# Patient Record
Sex: FEMALE | Race: WHITE | URBAN - METROPOLITAN AREA
[De-identification: names, ages, dates, MRNs, and addresses within clinical notes are randomized per-mention and may not be internally consistent; named-entity substitution may affect disease eponyms.]

---

## 2017-02-24 DIAGNOSIS — E53.8 VITAMIN B12 DEFICIENCY (NON ANEMIC): ICD-10-CM

## 2017-02-24 DIAGNOSIS — D50.0 IRON DEFICIENCY ANEMIA DUE TO CHRONIC BLOOD LOSS: ICD-10-CM

## 2017-02-24 LAB
BASOPHILS # BLD AUTO: 0.1 10E9/L (ref 0–0.2)
BASOPHILS NFR BLD AUTO: 0.7 %
DIFFERENTIAL METHOD BLD: ABNORMAL
EOSINOPHIL # BLD AUTO: 0.2 10E9/L (ref 0–0.7)
EOSINOPHIL NFR BLD AUTO: 3.5 %
ERYTHROCYTE [DISTWIDTH] IN BLOOD BY AUTOMATED COUNT: 13.5 % (ref 10–15)
FERRITIN SERPL-MCNC: 57 NG/ML (ref 12–150)
HCT VFR BLD AUTO: 46.1 % (ref 35–47)
HGB BLD-MCNC: 16 G/DL (ref 11.7–15.7)
IRON SATN MFR SERPL: 27 % (ref 15–46)
IRON SERPL-MCNC: 77 UG/DL (ref 35–180)
LYMPHOCYTES # BLD AUTO: 2.7 10E9/L (ref 0.8–5.3)
LYMPHOCYTES NFR BLD AUTO: 39.6 %
MCH RBC QN AUTO: 32.5 PG (ref 26.5–33)
MCHC RBC AUTO-ENTMCNC: 34.7 G/DL (ref 31.5–36.5)
MCV RBC AUTO: 94 FL (ref 78–100)
MONOCYTES # BLD AUTO: 0.7 10E9/L (ref 0–1.3)
MONOCYTES NFR BLD AUTO: 10.7 %
NEUTROPHILS # BLD AUTO: 3.1 10E9/L (ref 1.6–8.3)
NEUTROPHILS NFR BLD AUTO: 45.5 %
PLATELET # BLD AUTO: 247 10E9/L (ref 150–450)
RBC # BLD AUTO: 4.92 10E12/L (ref 3.8–5.2)
TIBC SERPL-MCNC: 281 UG/DL (ref 240–430)
VIT B12 SERPL-MCNC: 227 PG/ML (ref 193–986)
WBC # BLD AUTO: 6.8 10E9/L (ref 4–11)

## 2017-02-24 PROCEDURE — 83540 ASSAY OF IRON: CPT | Performed by: INTERNAL MEDICINE

## 2017-02-24 PROCEDURE — 85025 COMPLETE CBC W/AUTO DIFF WBC: CPT | Performed by: INTERNAL MEDICINE

## 2017-02-24 PROCEDURE — 83550 IRON BINDING TEST: CPT | Performed by: INTERNAL MEDICINE

## 2017-02-24 PROCEDURE — 82607 VITAMIN B-12: CPT | Performed by: INTERNAL MEDICINE

## 2017-02-24 PROCEDURE — 82728 ASSAY OF FERRITIN: CPT | Performed by: INTERNAL MEDICINE

## 2017-02-24 PROCEDURE — 36415 COLL VENOUS BLD VENIPUNCTURE: CPT | Performed by: INTERNAL MEDICINE

## 2017-02-27 ENCOUNTER — ONCOLOGY VISIT (OUTPATIENT)
Dept: ONCOLOGY | Facility: CLINIC | Age: 42
End: 2017-02-27
Payer: COMMERCIAL

## 2017-02-27 VITALS
SYSTOLIC BLOOD PRESSURE: 97 MMHG | BODY MASS INDEX: 25.69 KG/M2 | TEMPERATURE: 98.2 F | WEIGHT: 159.1 LBS | HEART RATE: 90 BPM | OXYGEN SATURATION: 97 % | RESPIRATION RATE: 18 BRPM | DIASTOLIC BLOOD PRESSURE: 65 MMHG

## 2017-02-27 DIAGNOSIS — E53.8 VITAMIN B12 DEFICIENCY (NON ANEMIC): Primary | ICD-10-CM

## 2017-02-27 DIAGNOSIS — F31.81 BIPOLAR II DISORDER (H): ICD-10-CM

## 2017-02-27 DIAGNOSIS — E61.1 IRON DEFICIENCY: ICD-10-CM

## 2017-02-27 DIAGNOSIS — G44.219 EPISODIC TENSION-TYPE HEADACHE, NOT INTRACTABLE: ICD-10-CM

## 2017-02-27 PROCEDURE — 99213 OFFICE O/P EST LOW 20 MIN: CPT | Mod: 25 | Performed by: INTERNAL MEDICINE

## 2017-02-27 PROCEDURE — 96372 THER/PROPH/DIAG INJ SC/IM: CPT

## 2017-02-27 RX ORDER — CYANOCOBALAMIN 1000 UG/ML
1 INJECTION, SOLUTION INTRAMUSCULAR; SUBCUTANEOUS
Qty: 3 ML | Refills: 3 | OUTPATIENT
Start: 2017-02-27 | End: 2018-02-27

## 2017-02-27 RX ORDER — CYANOCOBALAMIN 1000 UG/ML
1000 INJECTION, SOLUTION INTRAMUSCULAR; SUBCUTANEOUS
Status: DISCONTINUED | OUTPATIENT
Start: 2017-02-27 | End: 2017-02-27 | Stop reason: HOSPADM

## 2017-02-27 RX ORDER — CYANOCOBALAMIN 1000 UG/ML
1000 INJECTION, SOLUTION INTRAMUSCULAR; SUBCUTANEOUS
Status: CANCELLED
Start: 2017-03-01

## 2017-02-27 RX ADMIN — CYANOCOBALAMIN 1000 MCG: 1000 INJECTION, SOLUTION INTRAMUSCULAR; SUBCUTANEOUS at 15:48

## 2017-02-27 ASSESSMENT — PAIN SCALES - GENERAL: PAINLEVEL: NO PAIN (0)

## 2017-02-27 NOTE — PROGRESS NOTES
"Rhys Hernandez is a 41 year old female who presents for:  Chief Complaint   Patient presents with     Oncology Clinic Visit     6 month follow-up        Initial Vitals:  BP 97/65 (BP Location: Left arm, Patient Position: Chair, Cuff Size: Adult Regular)  Pulse 90  Temp 98.2  F (36.8  C) (Oral)  Resp 18  Wt 72.2 kg (159 lb 1.6 oz)  SpO2 97%  BMI 25.69 kg/m2 Estimated body mass index is 25.69 kg/(m^2) as calculated from the following:    Height as of 8/22/16: 1.676 m (5' 5.98\").    Weight as of this encounter: 72.2 kg (159 lb 1.6 oz).. Body surface area is 1.83 meters squared. BP completed using cuff size: regular  No Pain (0) No LMP recorded. Allergies and medications reviewed.     Medications: Medication refills not needed today.  Pharmacy name entered into [employer]: [id number] [place] [address]    Comments: Patient has noted more headaches lately. Has been having anxiety/panic attacks. Was on medications in the past which helped at that time.    10 minutes for nursing intake (face to face time)   Ml Ellis RN        "

## 2017-02-27 NOTE — PROGRESS NOTES
[facility] CANCER CLINIC  FOLLOW-UP VISIT NOTE    PATIENT NAME: Rhys Hernandez MRN # [id number]  DATE OF VISIT: Feb 27, 2017 YOB: 1975    REFERRING PROVIDER: No referring provider defined for this encounter.    DIAGNOSIS: Mixed Iron deficiency anemia and vitamin B12 deficiency post gastric bypass      SUBJECTIVE   Rhys Hernandez is 41 year old female with ronda-metrorrhagia and previous gastric bypass has been referred for iron deficiency anemia.     Rhys has noticed headaches over the past few weeks. She is not a headache person. Tylenol and ibuprofen does not seem to help. Headaches are quite intense.       PAST MEDICAL HISTORY   1. [place]- metrorrhagia  2. Iron deficiency anemia  3. Anxiety, Depression  4. Gastric bypass  5. Tubal ligation  6. Active nicotine abuse, previous alcohol      CURRENT OUTPATIENT MEDICATIONS     Current Outpatient Prescriptions   Medication Sig     cyanocobalamin (VITAMIN B12) 1000 MCG/ML injection Inject 1 mL (1,000 mcg) into the muscle every 30 days     Cholecalciferol (VITAMIN D) 2000 UNITS tablet Take 2,000 Units by mouth daily     Omega-3 Fatty Acids (OMEGA-3 FISH OIL PO)      MELATONIN PO      polyethylene glycol (MIRALAX) powder Take 17 g by mouth daily     thiamine (VITAMIN B-1) 100 MG tablet Take 100 mg by mouth daily.     Prenatal MV-Min-Fe Fum-FA-DHA (PRENATAL 1 PO) Take  by mouth.     VITAMIN B-12 1000 MCG SL SUBL Reported on 2/27/2017     No current facility-administered medications for this visit.      Facility-Administered Medications Ordered in Other Visits   Medication     cyanocobalamin (VITAMIN B12) injection 1,000 mcg        ALLERGIES   No Known Allergies     REVIEW OF SYSTEMS   As above in the HPI, o/w complete 12-point ROS was negative.     PHYSICAL EXAM   BP 97/65 (BP Location: Left arm, Patient Position: Chair, Cuff Size: Adult Regular)  Pulse 90  Temp 98.2  F (36.8  C) (Oral)  Resp 18  Wt 72.2 kg (159 lb 1.6 oz)  SpO2 97%   BMI 25.69 kg/m2   SpO2 Readings from Last 4 Encounters:   02/27/17 97%   10/22/16 96%   09/27/16 96%   08/30/16 96%     Wt Readings from Last 3 Encounters:   02/27/17 72.2 kg (159 lb 1.6 oz)   10/22/16 70.8 kg (156 lb)   08/22/16 68.9 kg (152 lb)     GEN: NAD  HEENT: PERRL, EOMI, no icterus, injection or pallor. Oropharynx is clear.  NECK: no cervical or supraclavicular lymphadenopathy  LUNGS: clear bilaterally  CV: regular, no murmurs, rubs, or gallops  ABDOMEN: soft, non-tender, non-distended, normal bowel sounds, no hepatosplenomegaly by percussion or palpation  EXT: warm, well perfused, no edema  NEURO: alert  SKIN: no rashes     LABORATORY AND IMAGING STUDIES     Recent Labs   Lab Test  08/25/15   0741  05/25/11   2029  03/10/10   0802   NA  141  137  140   POTASSIUM  4.3  3.9  4.4   CHLORIDE  108  101  106   CO2  24  27  27   ANIONGAP  9  9  7   BUN  9  12  12   CR  0.79  0.65  0.66   GLC  90  78  82   JOSE  8.8  8.7  8.9     Recent Labs   Lab Test  02/24/17   0727  08/18/16   0821  05/02/16   0716  01/15/16   0941  11/19/15   1342  08/25/15   0741   WBC  6.8  5.4  7.0  6.7  6.1  7.1   HGB  16.0*  15.7  13.6  9.2*  10.5*  10.1*   PLT  247  225  228  328  323  353   MCV  94  92  84  68*  68*  69*   NEUTROPHIL  45.5  48.9  42.3   --    --   41.1     Recent Labs   Lab Test  08/25/15   0741  09/20/12   0908  08/19/11   0925   BILITOTAL  0.3  0.4  0.4   ALKPHOS  57  66  48   ALT  19  25  27   AST  16  45  38   ALBUMIN  3.8  4.4  4.3     TSH   Date Value Ref Range Status   08/25/2015 3.32 0.40 - 4.00 mU/L Final   09/20/2012 1.62 0.4 - 5.0 mU/L Final     Recent Labs   Lab Test  02/24/17   0727  08/18/16   0821  05/02/16   0716   B12  227  238  184*     Recent Labs   Lab Test  02/24/17   0727  08/18/16   0821  05/02/16   0716  01/15/16   0941  11/19/15   1342  08/28/14   1204   BRENT  57  74  6*  7*  4*  4*   IRON  77  132  30*   --    --   23*   FEB  281  282  318   --    --   474*         ASSESSMENT AND PLAN   1. Iron  deficiency anemia with chronic blood loss from ronda-metrorrhagia and poor absorption secondary to gastric bypass  2. Vitamin B12 deficiency; after gastric bypass due to poor absorption despite oral supplmentation  3. Headaches  4. Active nicotine abuse  5. Anxiety, depression     DISCUSSION     Rhys has iron and B12 deficiency anemia as she cannot absorb iron after gastric bypass and menometrorrhagia.      Her iron panel remains adequate at this visit. Her Hgb is above the higher limit of normal, though not very different from last time. She would need IV iron intermittently for the rest of her life because of her gastric bypass.      In addition to her iron, she is also deficient in vitamin B12.  Her B12 remains low and I will start her on parenteral B12 injections every month. She will take it herself at home.     I would recommend 1 mg of vitamin B12 IM q 28 days. I will follow in 3 months or so with labs to assess response to B12 therapy.     I again reviewed the ill effects of smoking and will approach the topic again at next visit.  She promises me that she has been trying to quit smoking.  She has bought nicotine patches and plans to use them.  She has set goals for herself.  She plans to visit [place] from the money saved after quitting smoking.        PLAN      1. Recommend parenteral vitamin B12  2. I will check B12 levels at follow up visit  3. Follow up in 3 months with labs a week prior to clinic visit  4. I will stress on quitting smoking again at next visit. She notes that she does plan to quit.      Quincy Hussein  ,  Division of Hematology, Oncology & Transplantation  [facility].

## 2017-02-27 NOTE — PROGRESS NOTES
Infusion Nursing Note:  Rhys Hernandez presents today for B12 injection.    Patient seen by provider today: Yes: Dr Hussein   present during visit today: Not Applicable.    Treatment Conditions:  Labs reviewed by Dr Hussein.      Post Infusion Assessment:  Patient tolerated injection without incident.    Discharge Plan:   Patient discharged in stable condition accompanied by: self.  Departure Mode: Ambulatory.  Dr Hussein to complete disposition note. Patient will call to schedule follow-up. Going to do home B12 injections.    Ml Ellis RN

## 2017-02-27 NOTE — MR AVS SNAPSHOT
After Visit Summary   2/27/2017    Rhys Hernandez    MRN: [id number]           Patient Information     Date Of Birth          1975        Visit Information        Provider Department      2/27/2017 3:00 PM Quincy Hussein MD [facility]        Today's Diagnoses     Vitamin B12 deficiency (non anemic)    -  1    Episodic tension-type headache, not intractable        Iron deficiency        Bipolar II disorder (H)           Follow-ups after your visit        Follow-up notes from your care team     Return in about 3 months (around 5/27/2017).      Who to contact     If you have questions or need follow up information about today's clinic visit or your schedule please contact [facility] directly at [phone].  Normal or non-critical lab and imaging results will be communicated to you by MyChart, letter or phone within 4 business days after the clinic has received the results. If you do not hear from us within 7 days, please contact the clinic through MyChart or phone. If you have a critical or abnormal lab result, we will notify you by phone as soon as possible.  Submit refill requests through [employer] or call your pharmacy and they will forward the refill request to us. Please allow 3 business days for your refill to be completed.          Additional Information About Your Visit        MyChart Information     [employer] is an electronic gateway that provides easy, online access to your medical records. With [employer], you can request a clinic appointment, read your test results, renew a prescription or communicate with your care team.     To sign up for [employer] visit the website at [email or web address]   You will be asked to enter the access code listed below, as well as some personal information. Please follow the directions to create your username and password.     Your access code is: [email or web address]  Expires: 5/28/2017  3:55 PM     Your access code will   in 90 days. If you need help or a new code, please contact your [facility] or call [phone] for assistance.        Care EveryWhere ID     This is your Care EveryWhere ID. This could be used by other organizations to access your [place] medical records  [id number]        Your Vitals Were     Pulse Temperature Respirations Pulse Oximetry BMI (Body Mass Index)       90 98.2  F (36.8  C) (Oral) 18 97% 25.69 kg/m2        Blood Pressure from Last 3 Encounters:   17 97/65   10/22/16 101/65   16 (!) 87/59    Weight from Last 3 Encounters:   17 72.2 kg (159 lb 1.6 oz)   10/22/16 70.8 kg (156 lb)   16 68.9 kg (152 lb)              Today, you had the following     No orders found for display         Today's Medication Changes          These changes are accurate as of: 17  4:04 PM.  If you have any questions, ask your nurse or doctor.               These medicines have changed or have updated prescriptions.        Dose/Directions    * cyanocobalamin 1000 MCG Subl sublingual tablet   This may have changed:  Another medication with the same name was added. Make sure you understand how and when to take each.   Changed by:  Sindy Sanchez PA-C        Reported on 2017   Refills:  0       * cyanocobalamin 1000 MCG/ML injection   Commonly known as:  VITAMIN B12   This may have changed:  You were already taking a medication with the same name, and this prescription was added. Make sure you understand how and when to take each.   Used for:  Vitamin B12 deficiency (non anemic), Episodic tension-type headache, not intractable, Iron deficiency, Bipolar II disorder (H)   Changed by:  Quincy Hussein MD        Dose:  1 mL   Inject 1 mL (1,000 mcg) into the muscle every 30 days   Quantity:  3 mL   Refills:  3       * Notice:  This list has 2 medication(s) that are the same as other medications prescribed for you. Read the directions carefully, and ask  your doctor or other care provider to review them with you.         Where to get your medicines      Some of these will need a paper prescription and others can be bought over the counter.  Ask your nurse if you have questions.     You don't need a prescription for these medications     cyanocobalamin 1000 MCG/ML injection                Primary Care Provider Office Phone # Fax #    Angel Klein [phone]       [facility] [address]  [facility] [address]        Thank you!     Thank you for choosing [facility]  for your care. Our goal is always to provide you with excellent care. Hearing back from our patients is one way we can continue to improve our services. Please take a few minutes to complete the written survey that you may receive in the mail after your visit with us. Thank you!             Your Updated Medication List - Protect others around you: Learn how to safely use, store and throw away your medicines at www.disposemymeds.org.          This list is accurate as of: 2/27/17  4:04 PM.  Always use your most recent med list.                   Brand Name Dispense Instructions for use    * cyanocobalamin 1000 MCG Subl sublingual tablet      Reported on 2/27/2017       * cyanocobalamin 1000 MCG/ML injection    VITAMIN B12    3 mL    Inject 1 mL (1,000 mcg) into the muscle every 30 days       MELATONIN PO          OMEGA-3 FISH OIL PO          polyethylene glycol powder    MIRALAX    510 g    Take 17 g by mouth daily       PRENATAL 1 PO      Take  by mouth.       thiamine 100 MG tablet      Take 100 mg by mouth daily.       vitamin D 2000 UNITS tablet     100 tablet    Take 2,000 Units by mouth daily       * Notice:  This list has 2 medication(s) that are the same as other medications prescribed for you. Read the directions carefully, and ask your doctor or other care provider to review them with you.

## 2017-02-27 NOTE — MR AVS SNAPSHOT
After Visit Summary   2017    Rhys Hernandez    MRN: [id number]           Patient Information     Date Of Birth          1975        Visit Information        Provider Department      2017 3:30 PM NURSE ONLY CANCER CENTER [facility]        Today's Diagnoses     Vitamin B12 deficiency (non anemic)    -  1       Follow-ups after your visit        Who to contact     If you have questions or need follow up information about today's clinic visit or your schedule please contact [facility] directly at [phone].  Normal or non-critical lab and imaging results will be communicated to you by [employer], letter or phone within 4 business days after the clinic has received the results. If you do not hear from us within 7 days, please contact the clinic through [employer] or phone. If you have a critical or abnormal lab result, we will notify you by phone as soon as possible.  Submit refill requests through [email or web address] or call your pharmacy and they will forward the refill request to us. Please allow 3 business days for your refill to be completed.          Additional Information About Your Visit        MyChart Information     [email or web address] is an electronic gateway that provides easy, online access to your medical records. With [email or web address], you can request a clinic appointment, read your test results, renew a prescription or communicate with your care team.     To sign up for [email or web address] visit the website at [email or web address]   You will be asked to enter the access code listed below, as well as some personal information. Please follow the directions to create your username and password.     Your access code is: [email or web address]  Expires: 2017  3:55 PM     Your access code will  in 90 days. If you need help or a new code, please contact your [facility] or call [phone] for assistance.        Care EveryWhere ID     This is your Care  EveryWhere ID. This could be used by other organizations to access your [place] medical records  [id number]         Blood Pressure from Last 3 Encounters:   02/27/17 97/65   10/22/16 101/65   09/27/16 (!) 87/59    Weight from Last 3 Encounters:   02/27/17 72.2 kg (159 lb 1.6 oz)   10/22/16 70.8 kg (156 lb)   08/22/16 68.9 kg (152 lb)              Today, you had the following     No orders found for display         Today's Medication Changes          These changes are accurate as of: 2/27/17  3:55 PM.  If you have any questions, ask your nurse or doctor.               These medicines have changed or have updated prescriptions.        Dose/Directions    * cyanocobalamin 1000 MCG Subl sublingual tablet   This may have changed:  Another medication with the same name was added. Make sure you understand how and when to take each.   Changed by:  Sindy Sanchez PA-C        Reported on 2/27/2017   Refills:  0       * cyanocobalamin 1000 MCG/ML injection   Commonly known as:  VITAMIN B12   This may have changed:  You were already taking a medication with the same name, and this prescription was added. Make sure you understand how and when to take each.   Used for:  Vitamin B12 deficiency (non anemic), Episodic tension-type headache, not intractable, Iron deficiency, Bipolar II disorder (H)   Changed by:  Quincy Hussein MD        Dose:  1 mL   Inject 1 mL (1,000 mcg) into the muscle every 30 days   Quantity:  3 mL   Refills:  3       * Notice:  This list has 2 medication(s) that are the same as other medications prescribed for you. Read the directions carefully, and ask your doctor or other care provider to review them with you.         Where to get your medicines      Some of these will need a paper prescription and others can be bought over the counter.  Ask your nurse if you have questions.     You don't need a prescription for these medications     cyanocobalamin 1000 MCG/ML injection                Primary  Care Provider Office Phone # Fax #    Angel Klein [phone]       [facility] [address]  [facility] [address]        Thank you!     Thank you for choosing [facility]  for your care. Our goal is always to provide you with excellent care. Hearing back from our patients is one way we can continue to improve our services. Please take a few minutes to complete the written survey that you may receive in the mail after your visit with us. Thank you!             Your Updated Medication List - Protect others around you: Learn how to safely use, store and throw away your medicines at www.disposemymeds.org.          This list is accurate as of: 2/27/17  3:55 PM.  Always use your most recent med list.                   Brand Name Dispense Instructions for use    * cyanocobalamin 1000 MCG Subl sublingual tablet      Reported on 2/27/2017       * cyanocobalamin 1000 MCG/ML injection    VITAMIN B12    3 mL    Inject 1 mL (1,000 mcg) into the muscle every 30 days       MELATONIN PO          OMEGA-3 FISH OIL PO          polyethylene glycol powder    MIRALAX    510 g    Take 17 g by mouth daily       PRENATAL 1 PO      Take  by mouth.       thiamine 100 MG tablet      Take 100 mg by mouth daily.       vitamin D 2000 UNITS tablet     100 tablet    Take 2,000 Units by mouth daily       * Notice:  This list has 2 medication(s) that are the same as other medications prescribed for you. Read the directions carefully, and ask your doctor or other care provider to review them with you.

## 2018-02-02 ENCOUNTER — CARE COORDINATION (OUTPATIENT)
Dept: ONCOLOGY | Facility: CLINIC | Age: 43
End: 2018-02-02

## 2018-02-02 NOTE — PROGRESS NOTES
Returned this patient's call requesting follow-up visit with Dr. Hussein for new symptoms related to her iron deficiency anemia and low B12.  She states she feels like she can barely get through the workday, she is exhausted by the end of the day.  Advised that Dr. Hussein would not be back to this clinic for quite some time.  She is willing to see another provider.  Will call her back with plan.

## 2018-02-05 DIAGNOSIS — D50.0 IRON DEFICIENCY ANEMIA DUE TO CHRONIC BLOOD LOSS: Primary | ICD-10-CM

## 2018-02-05 NOTE — PROGRESS NOTES
See note below.  Dr. Hoover would be willing to see patient if ok with Dr. Hussein.  Dr. Hussein is in agreement.  Will have patient get lab work 1-2 days prior to seeing Dr. Hoover.  Scheduling will be calling her with date and time.

## 2018-02-07 DIAGNOSIS — D50.0 IRON DEFICIENCY ANEMIA DUE TO CHRONIC BLOOD LOSS: ICD-10-CM

## 2018-02-07 LAB
BASOPHILS # BLD AUTO: 0.1 10E9/L (ref 0–0.2)
BASOPHILS NFR BLD AUTO: 1.1 %
DIFFERENTIAL METHOD BLD: ABNORMAL
EOSINOPHIL # BLD AUTO: 0.2 10E9/L (ref 0–0.7)
EOSINOPHIL NFR BLD AUTO: 3.3 %
ERYTHROCYTE [DISTWIDTH] IN BLOOD BY AUTOMATED COUNT: 13 % (ref 10–15)
FERRITIN SERPL-MCNC: 49 NG/ML (ref 12–150)
HCT VFR BLD AUTO: 48 % (ref 35–47)
HGB BLD-MCNC: 15.8 G/DL (ref 11.7–15.7)
IMM GRANULOCYTES # BLD: 0 10E9/L (ref 0–0.4)
IMM GRANULOCYTES NFR BLD: 0.3 %
IRON SATN MFR SERPL: 56 % (ref 15–46)
IRON SERPL-MCNC: 163 UG/DL (ref 35–180)
LYMPHOCYTES # BLD AUTO: 2.4 10E9/L (ref 0.8–5.3)
LYMPHOCYTES NFR BLD AUTO: 32.7 %
MCH RBC QN AUTO: 30.7 PG (ref 26.5–33)
MCHC RBC AUTO-ENTMCNC: 32.9 G/DL (ref 31.5–36.5)
MCV RBC AUTO: 93 FL (ref 78–100)
MONOCYTES # BLD AUTO: 0.7 10E9/L (ref 0–1.3)
MONOCYTES NFR BLD AUTO: 10.3 %
NEUTROPHILS # BLD AUTO: 3.8 10E9/L (ref 1.6–8.3)
NEUTROPHILS NFR BLD AUTO: 52.3 %
PLATELET # BLD AUTO: 253 10E9/L (ref 150–450)
RBC # BLD AUTO: 5.14 10E12/L (ref 3.8–5.2)
TIBC SERPL-MCNC: 289 UG/DL (ref 240–430)
VIT B12 SERPL-MCNC: 195 PG/ML (ref 193–986)
WBC # BLD AUTO: 7.2 10E9/L (ref 4–11)

## 2018-02-07 PROCEDURE — 85025 COMPLETE CBC W/AUTO DIFF WBC: CPT | Performed by: INTERNAL MEDICINE

## 2018-02-07 PROCEDURE — 83550 IRON BINDING TEST: CPT | Performed by: INTERNAL MEDICINE

## 2018-02-07 PROCEDURE — 82728 ASSAY OF FERRITIN: CPT | Performed by: INTERNAL MEDICINE

## 2018-02-07 PROCEDURE — 82607 VITAMIN B-12: CPT | Performed by: INTERNAL MEDICINE

## 2018-02-07 PROCEDURE — 83540 ASSAY OF IRON: CPT | Performed by: INTERNAL MEDICINE

## 2018-02-07 PROCEDURE — 36415 COLL VENOUS BLD VENIPUNCTURE: CPT | Performed by: INTERNAL MEDICINE

## 2018-02-09 ENCOUNTER — ONCOLOGY VISIT (OUTPATIENT)
Dept: ONCOLOGY | Facility: CLINIC | Age: 43
End: 2018-02-09
Payer: COMMERCIAL

## 2018-02-09 VITALS
RESPIRATION RATE: 16 BRPM | TEMPERATURE: 98.3 F | OXYGEN SATURATION: 100 % | SYSTOLIC BLOOD PRESSURE: 100 MMHG | DIASTOLIC BLOOD PRESSURE: 68 MMHG | WEIGHT: 162.5 LBS | BODY MASS INDEX: 26.24 KG/M2 | HEART RATE: 82 BPM

## 2018-02-09 DIAGNOSIS — R53.83 OTHER FATIGUE: ICD-10-CM

## 2018-02-09 DIAGNOSIS — F32.A DEPRESSION, UNSPECIFIED DEPRESSION TYPE: Primary | ICD-10-CM

## 2018-02-09 LAB — TSH SERPL DL<=0.005 MIU/L-ACNC: 2.6 MU/L (ref 0.4–4)

## 2018-02-09 PROCEDURE — 99214 OFFICE O/P EST MOD 30 MIN: CPT | Performed by: INTERNAL MEDICINE

## 2018-02-09 PROCEDURE — 84443 ASSAY THYROID STIM HORMONE: CPT | Performed by: INTERNAL MEDICINE

## 2018-02-09 PROCEDURE — 36415 COLL VENOUS BLD VENIPUNCTURE: CPT | Performed by: INTERNAL MEDICINE

## 2018-02-09 ASSESSMENT — PAIN SCALES - GENERAL: PAINLEVEL: NO PAIN (0)

## 2018-02-09 NOTE — PROGRESS NOTES
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 I am seeing her in his absence.  She has iron deficiency and B12 deficiency anemia 2/2 ronda-metrorrhagia and previous gastric bypass surgery . She has received Venofer in the past. She was also started on monthly B12 injections by Dr Hussein. She was last seen by him in Feb 2017    Interval history   She comes in today and tells me that over the last few months she has noticed worsening fatigue and tiredness. Off-and-on she has had headaches for which he takes Tylenol and ibuprofen. There is no focal neurological problems. No neuropathy. No pain. No pica symptoms. She has not noticed any unusual bleeding. Now she has Mirena IUD and with that the menstrual bleeding is very scant. Denies any infections fevers or night sweats. No B symptoms. Off-and-on she has had nausea and vomiting since her gastric bypass surgery but this is not new. Off-and-on she has had constipation and this is also not new for her. Denies new swellings. Denies new skin problems. No trouble breathing. She admits that she feels very depressed and stressed out. She attributes it to her home situation as she is not on talking terms with her  for a long time although they are living together. She has arguments with her son and the oldest child moved out a few months ago and the youngest son is also not happy with the living situation. She is not taking any site medications currently. She denies any suicidal or homicidal ideation. She is willing to get psychiatric evaluation. She has not drank alcohol since 2014. She continues to smoke 1 pack a day. She is using vitamin B12 sublingually 1000  g daily          PAST MEDICAL HISTORY- Reviewed in EPIC as below   1. [place]- metrorrhagia  2. Iron deficiency anemia  3. Anxiety, Depression  4. Gastric bypass  5. Tubal  ligation  6. Active nicotine abuse, previous alcohol      CURRENT OUTPATIENT MEDICATIONS     Current Outpatient Prescriptions   Medication Sig     cyanocobalamin (VITAMIN B12) 1000 MCG/ML injection Inject 1 mL (1,000 mcg) into the muscle every 30 days     Cholecalciferol (VITAMIN D) 2000 UNITS tablet Take 2,000 Units by mouth daily     Omega-3 Fatty Acids (OMEGA-3 FISH OIL PO)      MELATONIN PO      polyethylene glycol (MIRALAX) powder Take 17 g by mouth daily     thiamine (VITAMIN B-1) 100 MG tablet Take 100 mg by mouth daily.     Prenatal MV-Min-Fe Fum-FA-DHA (PRENATAL 1 PO) Take  by mouth.     VITAMIN B-12 1000 MCG SL SUBL Reported on 2/27/2017     No current facility-administered medications for this visit.         ALLERGIES   No Known Allergies     SOCIAL HISTORY   This is mentioned in history of present illness     REVIEW OF SYSTEMS     A comprehensive ROS was otherwise neg       PHYSICAL EXAM   /68  Pulse 82  Temp 98.3  F (36.8  C) (Oral)  Resp 16  Wt 73.7 kg (162 lb 8 oz)  SpO2 100%  BMI 26.24 kg/m2   SpO2 Readings from Last 4 Encounters:   02/09/18 100%   02/27/17 97%   10/22/16 96%   09/27/16 96%     Wt Readings from Last 3 Encounters:   02/09/18 73.7 kg (162 lb 8 oz)   02/27/17 72.2 kg (159 lb 1.6 oz)   10/22/16 70.8 kg (156 lb)     CONSTITUTIONAL: no acute distress  EYES: PERRLA, no palor or icterus.   ENT/MOUTH: no mouth lesions. Oropharynx normal  CVS: s1s2 no m r g .   RESPIRATORY: clear to auscultation b/l  GI: soft non tender no hepatosplenomegaly  NEURO: AAOX3  Grossly non focal neuro exam  INTEGUMENT: no obvious rashes  LYMPHATIC: no palpable cervical, supraclavicular, axillary or inguinal LAD  MUSCULOSKELETAL: Unremarkable. No bony tenderness.   EXTREMITIES: no edema  PSYCH: Mentation, mood and affect are depressed. Decision making capacity is intact       LABORATORY AND IMAGING STUDIES     Reviewed  Results for YASMIN FELIX (MRN [id number]) as of 2/9/2018 07:42   Ref. Range  2/24/2017 07:27 2/7/2018 07:22   Ferritin Latest Ref Range: 12 - 150 ng/mL 57 49   Iron Latest Ref Range: 35 - 180 ug/dL 77 163   Iron Binding Cap Latest Ref Range: 240 - 430 ug/dL 281 289   Iron Saturation Index Latest Ref Range: 15 - 46 % 27 56 (H)   Vitamin B12 Latest Ref Range: 193 - 986 pg/mL 227 195   WBC Latest Ref Range: 4.0 - 11.0 10e9/L 6.8 7.2   Hemoglobin Latest Ref Range: 11.7 - 15.7 g/dL 16.0 (H) 15.8 (H)   Hematocrit Latest Ref Range: 35.0 - 47.0 % 46.1 48.0 (H)   Platelet Count Latest Ref Range: 150 - 450 10e9/L 247 253   RBC Count Latest Ref Range: 3.8 - 5.2 10e12/L 4.92 5.14   MCV Latest Ref Range: 78 - 100 fl 94 93   MCH Latest Ref Range: 26.5 - 33.0 pg 32.5 30.7   MCHC Latest Ref Range: 31.5 - 36.5 g/dL 34.7 32.9   RDW Latest Ref Range: 10.0 - 15.0 % 13.5 13.0   Diff Method Unknown Automated Method Automated Method   % Neutrophils Latest Units: % 45.5 52.3   % Lymphocytes Latest Units: % 39.6 32.7   % Monocytes Latest Units: % 10.7 10.3   % Eosinophils Latest Units: % 3.5 3.3   % Basophils Latest Units: % 0.7 1.1   % Immature Granulocytes Latest Units: %  0.3   Absolute Neutrophil Latest Ref Range: 1.6 - 8.3 10e9/L 3.1 3.8   Absolute Lymphocytes Latest Ref Range: 0.8 - 5.3 10e9/L 2.7 2.4   Absolute Monocytes Latest Ref Range: 0.0 - 1.3 10e9/L 0.7 0.7   Absolute Eosinophils Latest Ref Range: 0.0 - 0.7 10e9/L 0.2 0.2   Absolute Basophils Latest Ref Range: 0.0 - 0.2 10e9/L 0.1 0.1   Abs Immature Granulocytes Latest Ref Range: 0 - 0.4 10e9/L  0.0          ASSESSMENT AND PLAN   Fatigue. This is most likely related to underlying depression. She does not have any suicidal or homicidal ideation. I strongly encouraged her to follow with a psychiatrist. She is willing to see one. I gave her a referral to psychiatry.  I have also ordered a TSH to rule out hypothyroidism. We discussed that at this time. Does not have any anemia or iron deficiency so it is unlikely that fatigue is due to hematological  issue.  I also encouraged her to seek help with counseling for herself and her family.    Iron deficiency. In the past she has received intravenous iron. Iron studies are adequate now. Intermittently she will need iron but at this time it is hard to predict when it will been needed next. Her menses are now much lighter after insertion of Mirena IUD. She is a status post gastric bypass surgery so she is unable to tolerate oral iron.    Vitamin B12 deficiency. Vitamin B12 level is low normal. She continues on sublingual vitamin B12. I told her that she should take 2000  g of vitamin B12 sublingually daily. This should be continued indefinitely.    Discussion regarding smoking cessation. I gave her a smoking surgeon counseling    I congratulated her on quitting alcohol.    Going forward she will follow with Dr. Hussein on an as-needed basis. She will continue to follow with primary care physician and make an appointment with psychiatry.    I answered all of her questions to her satisfaction and she is agreeable and comfortable with this plan    Marj Hoover

## 2018-02-09 NOTE — NURSING NOTE
"Oncology Rooming Note    February 9, 2018 7:56 AM   Rhys Hernandez is a 42 year old female who presents for:    Chief Complaint   Patient presents with     Oncology Clinic Visit     f/u      Initial Vitals: /68  Pulse 82  Temp 98.3  F (36.8  C) (Oral)  Resp 16  Wt 73.7 kg (162 lb 8 oz)  SpO2 100%  BMI 26.24 kg/m2 Estimated body mass index is 26.24 kg/(m^2) as calculated from the following:    Height as of 8/22/16: 1.676 m (5' 5.98\").    Weight as of this encounter: 73.7 kg (162 lb 8 oz). Body surface area is 1.85 meters squared.  No Pain (0) Comment: Data Unavailable   No LMP recorded.  Allergies reviewed: Yes  Medications reviewed: Yes    Medications: Medication refills not needed today.  Pharmacy name entered into [employer] ([employer]): [id number] [place] [address]         8 minutes for nursing intake (face to face time)     YODIT CONTRERAS RN              "

## 2018-02-09 NOTE — MR AVS SNAPSHOT
After Visit Summary   2/9/2018    Rhys Hernandez    MRN: [id number]           Patient Information     Date Of Birth          1975        Visit Information        Provider Department      2/9/2018 7:45 AM Marj Hoover MD [facility]        Today's Diagnoses     Depression, unspecified depression type    -  1    Other fatigue          Care Instructions    Labs today    Refer to Psychiatry asap    Increase B12 to 2000mcg daily    Follow with Dr Hussein as needed          Follow-ups after your visit        Additional Services     MENTAL HEALTH REFERRAL  - Adult; Psychiatry and Medication Management; Psychiatry; [facility]: Psychiatry Clinic [phone]; We will contact you to schedule the appointment or please call with any questions       All scheduling is subject to the client's specific insurance plan & benefits, provider/location availability, and provider clinical specialities.  Please arrive 15 minutes early for your first appointment and bring your completed paperwork.    Please be aware that coverage of these services is subject to the terms and limitations of your health insurance plan.  Call member services at your health plan with any benefit or coverage questions.                            Who to contact     If you have questions or need follow up information about today's clinic visit or your schedule please contact [facility] directly at [phone].  Normal or non-critical lab and imaging results will be communicated to you by MyChart, letter or phone within 4 business days after the clinic has received the results. If you do not hear from us within 7 days, please contact the clinic through MyChart or phone. If you have a critical or abnormal lab result, we will notify you by phone as soon as possible.  Submit refill requests through [email or web address] or call your pharmacy and they will forward the refill request to us. Please allow 3 business days for your refill  to be completed.          Additional Information About Your Visit        [employer] Information     [facility] is an electronic gateway that provides easy, online access to your medical records. With [facility], you can request a clinic appointment, read your test results, renew a prescription or communicate with your care team.     To sign up for [facility] visit the website at [email or web address]   You will be asked to enter the access code listed below, as well as some personal information. Please follow the directions to create your username and password.     Your access code is: [email or web address]  Expires: 5/10/2018 10:37 AM     Your access code will  in 90 days. If you need help or a new code, please contact your [facility] or call [phone] for assistance.        Care EveryWhere ID     This is your Care EveryWhere ID. This could be used by other organizations to access your [place] medical records  [id number]        Your Vitals Were     Pulse Temperature Respirations Pulse Oximetry BMI (Body Mass Index)       82 98.3  F (36.8  C) (Oral) 16 100% 26.24 kg/m2        Blood Pressure from Last 3 Encounters:   18 100/68   17 97/65   10/22/16 101/65    Weight from Last 3 Encounters:   18 73.7 kg (162 lb 8 oz)   17 72.2 kg (159 lb 1.6 oz)   10/22/16 70.8 kg (156 lb)              We Performed the Following     MENTAL HEALTH REFERRAL  - Adult; Psychiatry and Medication Management; Psychiatry; [facility]: Psychiatry Clinic [phone]; We will contact you to schedule the appointment or please call with any questions     TSH with free T4 reflex        Primary Care Provider Office Phone # Fax #    Angel Klein [phone]       [address]  [facility] [address]        Equal Access to Services     ALECIA HOGUE : Marybeth priceo Sodylan, waaxda luqadaha, qaybta kaalmada adeegyatyra, peter montaño. So [facility]  [phone].    ATENCIÓN: Si cathy shoemaker, tiene a drake disposición servicios gratuitos de asistencia lingüística. Nadia howell [phone].    We comply with applicable federal civil rights laws and [place] laws. We do not discriminate on the basis of race, color, national origin, age, disability, sex, sexual orientation, or gender identity.            Thank you!     Thank you for choosing [facility]  for your care. Our goal is always to provide you with excellent care. Hearing back from our patients is one way we can continue to improve our services. Please take a few minutes to complete the written survey that you may receive in the mail after your visit with us. Thank you!             Your Updated Medication List - Protect others around you: Learn how to safely use, store and throw away your medicines at www.disposemymeds.org.          This list is accurate as of 2/9/18 10:37 AM.  Always use your most recent med list.                   Brand Name Dispense Instructions for use Diagnosis    * cyanocobalamin 1000 MCG Subl sublingual tablet      Reported on 2/27/2017        * cyanocobalamin 1000 MCG/ML injection    VITAMIN B12    3 mL    Inject 1 mL (1,000 mcg) into the muscle every 30 days    Vitamin B12 deficiency (non anemic), Episodic tension-type headache, not intractable, Iron deficiency, Bipolar II disorder (H)       MELATONIN PO           OMEGA-3 FISH OIL PO           polyethylene glycol powder    MIRALAX    510 g    Take 17 g by mouth daily    Constipation       PRENATAL 1 PO      Take  by mouth.        thiamine 100 MG tablet      Take 100 mg by mouth daily.        vitamin D 2000 UNITS tablet     100 tablet    Take 2,000 Units by mouth daily    Unspecified vitamin D deficiency       * Notice:  This list has 2 medication(s) that are the same as other medications prescribed for you. Read the directions carefully, and ask your doctor or other care provider to review them with you.

## 2018-02-09 NOTE — LETTER
2/9/2018         RE: Rhys Hernandez  [address]   [facility] [address]        Dear Colleague,    Thank you for referring your patient, Rhys Hernandez, to the [facility]. Please see a copy of my visit note below.    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 I am seeing her in his absence.  She has iron deficiency and B12 deficiency anemia 2/2 ronda-metrorrhagia and previous gastric bypass surgery . She has received Venofer in the past. She was also started on monthly B12 injections by Dr Hussein. She was last seen by him in Feb 2017    Interval history   She comes in today and tells me that over the last few months she has noticed worsening fatigue and tiredness. Off-and-on she has had headaches for which he takes Tylenol and ibuprofen. There is no focal neurological problems. No neuropathy. No pain. No pica symptoms. She has not noticed any unusual bleeding. Now she has Mirena IUD and with that the menstrual bleeding is very scant. Denies any infections fevers or night sweats. No B symptoms. Off-and-on she has had nausea and vomiting since her gastric bypass surgery but this is not new. Off-and-on she has had constipation and this is also not new for her. Denies new swellings. Denies new skin problems. No trouble breathing. She admits that she feels very depressed and stressed out. She attributes it to her home situation as she is not on talking terms with her  for a long time although they are living together. She has arguments with her son and the oldest child moved out a few months ago and the youngest son is also not happy with the living situation. She is not taking any site medications currently. She denies any suicidal or homicidal ideation. She is willing to get psychiatric evaluation. She has not drank alcohol since 2014. She  continues to smoke 1 pack a day. She is using vitamin B12 sublingually 1000  g daily          PAST MEDICAL HISTORY- Reviewed in EPIC as below   1. [place]- metrorrhagia  2. Iron deficiency anemia  3. Anxiety, Depression  4. Gastric bypass  5. Tubal ligation  6. Active nicotine abuse, previous alcohol      CURRENT OUTPATIENT MEDICATIONS     Current Outpatient Prescriptions   Medication Sig     cyanocobalamin (VITAMIN B12) 1000 MCG/ML injection Inject 1 mL (1,000 mcg) into the muscle every 30 days     Cholecalciferol (VITAMIN D) 2000 UNITS tablet Take 2,000 Units by mouth daily     Omega-3 Fatty Acids (OMEGA-3 FISH OIL PO)      MELATONIN PO      polyethylene glycol (MIRALAX) powder Take 17 g by mouth daily     thiamine (VITAMIN B-1) 100 MG tablet Take 100 mg by mouth daily.     Prenatal MV-Min-Fe Fum-FA-DHA (PRENATAL 1 PO) Take  by mouth.     VITAMIN B-12 1000 MCG SL SUBL Reported on 2/27/2017     No current facility-administered medications for this visit.         ALLERGIES   No Known Allergies     SOCIAL HISTORY   This is mentioned in history of present illness     REVIEW OF SYSTEMS     A comprehensive ROS was otherwise neg       PHYSICAL EXAM   /68  Pulse 82  Temp 98.3  F (36.8  C) (Oral)  Resp 16  Wt 73.7 kg (162 lb 8 oz)  SpO2 100%  BMI 26.24 kg/m2   SpO2 Readings from Last 4 Encounters:   02/09/18 100%   02/27/17 97%   10/22/16 96%   09/27/16 96%     Wt Readings from Last 3 Encounters:   02/09/18 73.7 kg (162 lb 8 oz)   02/27/17 72.2 kg (159 lb 1.6 oz)   10/22/16 70.8 kg (156 lb)     CONSTITUTIONAL: no acute distress  EYES: PERRLA, no palor or icterus.   ENT/MOUTH: no mouth lesions. Oropharynx normal  CVS: s1s2 no m r g .   RESPIRATORY: clear to auscultation b/l  GI: soft non tender no hepatosplenomegaly  NEURO: AAOX3  Grossly non focal neuro exam  INTEGUMENT: no obvious rashes  LYMPHATIC: no palpable cervical, supraclavicular, axillary or inguinal LAD  MUSCULOSKELETAL: Unremarkable. No bony tenderness.    EXTREMITIES: no edema  PSYCH: Mentation, mood and affect are depressed. Decision making capacity is intact       LABORATORY AND IMAGING STUDIES     Reviewed  Results for YASMIN FELIX (MRN [id number]) as of 2/9/2018 07:42   Ref. Range 2/24/2017 07:27 2/7/2018 07:22   Ferritin Latest Ref Range: 12 - 150 ng/mL 57 49   Iron Latest Ref Range: 35 - 180 ug/dL 77 163   Iron Binding Cap Latest Ref Range: 240 - 430 ug/dL 281 289   Iron Saturation Index Latest Ref Range: 15 - 46 % 27 56 (H)   Vitamin B12 Latest Ref Range: 193 - 986 pg/mL 227 195   WBC Latest Ref Range: 4.0 - 11.0 10e9/L 6.8 7.2   Hemoglobin Latest Ref Range: 11.7 - 15.7 g/dL 16.0 (H) 15.8 (H)   Hematocrit Latest Ref Range: 35.0 - 47.0 % 46.1 48.0 (H)   Platelet Count Latest Ref Range: 150 - 450 10e9/L 247 253   RBC Count Latest Ref Range: 3.8 - 5.2 10e12/L 4.92 5.14   MCV Latest Ref Range: 78 - 100 fl 94 93   MCH Latest Ref Range: 26.5 - 33.0 pg 32.5 30.7   MCHC Latest Ref Range: 31.5 - 36.5 g/dL 34.7 32.9   RDW Latest Ref Range: 10.0 - 15.0 % 13.5 13.0   Diff Method Unknown Automated Method Automated Method   % Neutrophils Latest Units: % 45.5 52.3   % Lymphocytes Latest Units: % 39.6 32.7   % Monocytes Latest Units: % 10.7 10.3   % Eosinophils Latest Units: % 3.5 3.3   % Basophils Latest Units: % 0.7 1.1   % Immature Granulocytes Latest Units: %  0.3   Absolute Neutrophil Latest Ref Range: 1.6 - 8.3 10e9/L 3.1 3.8   Absolute Lymphocytes Latest Ref Range: 0.8 - 5.3 10e9/L 2.7 2.4   Absolute Monocytes Latest Ref Range: 0.0 - 1.3 10e9/L 0.7 0.7   Absolute Eosinophils Latest Ref Range: 0.0 - 0.7 10e9/L 0.2 0.2   Absolute Basophils Latest Ref Range: 0.0 - 0.2 10e9/L 0.1 0.1   Abs Immature Granulocytes Latest Ref Range: 0 - 0.4 10e9/L  0.0          ASSESSMENT AND PLAN   Fatigue. This is most likely related to underlying depression. She does not have any suicidal or homicidal ideation. I strongly encouraged her to follow with a psychiatrist. She is willing to  see one. I gave her a referral to psychiatry.  I have also ordered a TSH to rule out hypothyroidism. We discussed that at this time. Does not have any anemia or iron deficiency so it is unlikely that fatigue is due to hematological issue.  I also encouraged her to seek help with counseling for herself and her family.    Iron deficiency. In the past she has received intravenous iron. Iron studies are adequate now. Intermittently she will need iron but at this time it is hard to predict when it will been needed next. Her menses are now much lighter after insertion of Mirena IUD. She is a status post gastric bypass surgery so she is unable to tolerate oral iron.    Vitamin B12 deficiency. Vitamin B12 level is low normal. She continues on sublingual vitamin B12. I told her that she should take 2000  g of vitamin B12 sublingually daily. This should be continued indefinitely.    Discussion regarding smoking cessation. I gave her a smoking surgeon counseling    I congratulated her on quitting alcohol.    Going forward she will follow with Dr. Hussein on an as-needed basis. She will continue to follow with primary care physician and make an appointment with psychiatry.    I answered all of her questions to her satisfaction and she is agreeable and comfortable with this plan    Marj Hoover          Again, thank you for allowing me to participate in the care of your patient.        Sincerely,        Marj Hoover MD

## 2018-02-09 NOTE — PATIENT INSTRUCTIONS
Labs today    Refer to Psychiatry asap    Increase B12 to 2000mcg daily    Follow with Dr Hussein as needed

## 2018-02-10 ASSESSMENT — PATIENT HEALTH QUESTIONNAIRE - PHQ9: SUM OF ALL RESPONSES TO PHQ QUESTIONS 1-9: 27

## 2018-02-12 ENCOUNTER — TELEPHONE (OUTPATIENT)
Dept: ONCOLOGY | Facility: CLINIC | Age: 43
End: 2018-02-12

## 2018-04-03 ENCOUNTER — OFFICE VISIT (OUTPATIENT)
Dept: FAMILY MEDICINE | Facility: CLINIC | Age: 43
End: 2018-04-03
Payer: OTHER MISCELLANEOUS

## 2018-04-03 VITALS
HEART RATE: 75 BPM | WEIGHT: 159 LBS | TEMPERATURE: 98.2 F | HEIGHT: 65 IN | OXYGEN SATURATION: 98 % | DIASTOLIC BLOOD PRESSURE: 69 MMHG | BODY MASS INDEX: 26.49 KG/M2 | SYSTOLIC BLOOD PRESSURE: 102 MMHG

## 2018-04-03 DIAGNOSIS — R29.898 HAND WEAKNESS: Primary | ICD-10-CM

## 2018-04-03 DIAGNOSIS — F10.21 ALCOHOL DEPENDENCE IN REMISSION (H): ICD-10-CM

## 2018-04-03 DIAGNOSIS — G56.03 BILATERAL CARPAL TUNNEL SYNDROME: ICD-10-CM

## 2018-04-03 DIAGNOSIS — M77.11 LATERAL EPICONDYLITIS OF BOTH ELBOWS: ICD-10-CM
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
## 2018-04-03 DIAGNOSIS — M77.02 MEDIAL EPICONDYLITIS OF ELBOW, LEFT: ICD-10-CM

## 2018-04-03 DIAGNOSIS — M77.01 MEDIAL EPICONDYLITIS OF ELBOW, RIGHT: ICD-10-CM

## 2018-04-03 DIAGNOSIS — M77.12 LATERAL EPICONDYLITIS OF BOTH ELBOWS: ICD-10-CM

## 2018-04-03 PROCEDURE — 99214 OFFICE O/P EST MOD 30 MIN: CPT | Performed by: NURSE PRACTITIONER

## 2018-04-03 RX ORDER — PREDNISONE 20 MG/1
TABLET ORAL
Qty: 20 TABLET | Refills: 0 | Status: SHIPPED | OUTPATIENT
Start: 2018-04-03 | End: 2018-05-30

## 2018-04-03 ASSESSMENT — PAIN SCALES - GENERAL: PAINLEVEL: WORST PAIN (10)

## 2018-04-03 NOTE — MR AVS SNAPSHOT
After Visit Summary   4/3/2018    Rhys Hernandez    MRN: [id number]           Patient Information     Date Of Birth          1975        Visit Information        Provider Department      4/3/2018 2:20 PM Delmi Lucas, APRN CNP [facility]        Today's Diagnoses     Hand weakness    -  1    Bilateral carpal tunnel syndrome        Medial epicondylitis of elbow, right        Medial epicondylitis of elbow, left        Lateral epicondylitis of both elbows        Work related injury          Care Instructions    At [facility], we strive to deliver an exceptional experience to you, every time we see you.  If you receive a survey in the mail, please send us back your thoughts. We really do value your feedback.    Based on your medical history, these are the current health maintenance/preventive care services that you are due for (some may have been done at this visit.)  Health Maintenance Due   Topic Date Due     DEPRESSION SCREENING  03/05/2013     DEPRESSION ACTION PLAN Q1 YR  09/19/2013     TETANUS IMMUNIZATION (SYSTEM ASSIGNED)  01/14/2018         Suggested websites for health information:  Www.TopDeejays : Up to date and easily searchable information on multiple topics.  Www.medlineplus.gov : medication info, interactive tutorials, watch real surgeries online  Www.familydoctor.org : good info from the Academy of Family Physicians  Www.cdc.gov : public health info, travel advisories, epidemics (H1N1)  Www.aap.org : children's health info, normal development, vaccinations  Www.health.[facility].[place].us : [place] dept of health, public health issues in [place], N1N1    Your care team:                            Family Medicine Internal Medicine   MD Chino Cordoba MD Shantel Branch-Fleming, MD Katya Georgiev PA-C Megan Hill, MICHELLE Klein MD Pediatrics   GURJIT Ghotra, CNP Ann Koo APRN KHANH Youngblood,  MD Meme Wesley MD Kim Thein, APRN CNP      Clinic hours: Monday - Thursday 7 am-7 pm; Fridays 7 am-5 pm.   Urgent care: Monday - Friday 11 am-9 pm; Saturday and Sunday 9 am-5 pm.  Pharmacy : Monday -Thursday 8 am-8 pm; Friday 8 am-6 pm; Saturday and Sunday 9 am-5 pm.     Clinic: [phone]   Pharmacy: [phone]              Follow-ups after your visit        Additional Services     NEUROLOGY ADULT REFERRAL       Your provider has referred you for the following:   EMG at [facility]: [facility] [place] [phone]   [email or web address]    Please be aware that coverage of these services is subject to the terms and limitations of your health insurance plan.  Call member services at your health plan with any benefit or coverage questions.      Please bring the following with you to your appointment:    (1) Any X-Rays, CTs or MRIs which have been performed.  Contact the facility where they were done to arrange for  prior to your scheduled appointment.    (2) List of current medications  (3) This referral request   (4) Any documents/labs given to you for this referral            ORTHOPEDICS ADULT REFERRAL       Your provider has referred you to: [facility]: [facility] [place] [phone]    [email or web address]    Please be aware that coverage of these services is subject to the terms and limitations of your health insurance plan.  Call member services at your health plan with any benefit or coverage questions.      Please bring the following to your appointment:    >>   Any x-rays, CTs or MRIs which have been performed.  Contact the facility where they were done to arrange for  prior to your scheduled appointment.    >>   List of current medications   >>   This referral request   >>   Any documents/labs given to you for this referral                  Who  "to contact     If you have questions or need follow up information about today's clinic visit or your schedule please contact [facility] directly at [phone].  Normal or non-critical lab and imaging results will be communicated to you by MyChart, letter or phone within 4 business days after the clinic has received the results. If you do not hear from us within 7 days, please contact the clinic through MyChart or phone. If you have a critical or abnormal lab result, we will notify you by phone as soon as possible.  Submit refill requests through [employer] or call your pharmacy and they will forward the refill request to us. Please allow 3 business days for your refill to be completed.          Additional Information About Your Visit        [employer] [facility] [employer] Information     [employer] lets you send messages to your doctor, view your test results, renew your prescriptions, schedule appointments and more. To sign up, go to [email or web address] . Click on \"Log in\" on the left side of the screen, which will take you to the Welcome page. Then click on \"Sign up Now\" on the right side of the page.     You will be asked to enter the access code listed below, as well as some personal information. Please follow the directions to create your username and password.     Your access code is: [email or web address]  Expires: 5/10/2018 11:37 AM     Your access code will  in 90 days. If you need help or a new code, please call your [facility] clinic or [phone].        Care EveryWhere ID     This is your Care EveryWhere ID. This could be used by other organizations to access your [facility] medical records  [id number]        Your Vitals Were     Pulse Temperature Height Pulse Oximetry Breastfeeding? BMI (Body Mass Index)    75 98.2  F (36.8  C) (Oral) 5' 5\" (1.651 m) 98% No 26.46 kg/m2       Blood Pressure from Last 3 Encounters:   18 102/69   18 100/68   17 97/65    Weight from Last 3 Encounters: "   04/03/18 159 lb (72.1 kg)   02/09/18 162 lb 8 oz (73.7 kg)   02/27/17 159 lb 1.6 oz (72.2 kg)              We Performed the Following     NEUROLOGY ADULT REFERRAL     ORTHOPEDICS ADULT REFERRAL          Today's Medication Changes          These changes are accurate as of 4/3/18  2:41 PM.  If you have any questions, ask your nurse or doctor.               Start taking these medicines.        Dose/Directions    predniSONE 20 MG tablet   Commonly known as:  DELTASONE   Used for:  Hand weakness, Bilateral carpal tunnel syndrome, Medial epicondylitis of elbow, right, Medial epicondylitis of elbow, left, Lateral epicondylitis of both elbows   Started by:  Delmi Lucas APRN CNP        Take 3 tabs (60 mg) by mouth daily x 3 days, 2 tabs (40 mg) daily x 3 days, 1 tab (20 mg) daily x 3 days, then 1/2 tab (10 mg) x 3 days.   Quantity:  20 tablet   Refills:  0            Where to get your medicines      These medications were sent to [place] [address] [place] [address] [address]     Phone:  [phone]     predniSONE 20 MG tablet                Primary Care Provider Office Phone # Fax #    Angel Klein [phone]       [address]  [facility] [address]        Equal Access to Services     ALECIA HOGUE AH: Hadii aad ku hadasho Soomaali, waaxda luqadaha, qaybta kaalmada adeegyada, peter montaño. So [facility] [phone].    ATENCIÓN: Si habla español, tiene a drake disposición servicios gratuitos de asistencia lingüística. Llame al [phone].    We comply with applicable federal civil rights laws and [place] laws. We do not discriminate on the basis of race, color, national origin, age, disability, sex, sexual orientation, or gender identity.            Thank you!     Thank you for choosing [facility]  for your care. Our goal is always to provide you with excellent care. Hearing back from  our patients is one way we can continue to improve our services. Please take a few minutes to complete the written survey that you may receive in the mail after your visit with us. Thank you!             Your Updated Medication List - Protect others around you: Learn how to safely use, store and throw away your medicines at www.disposemymeds.org.          This list is accurate as of 4/3/18  2:41 PM.  Always use your most recent med list.                   Brand Name Dispense Instructions for use Diagnosis    cyanocobalamin 1000 MCG Subl sublingual tablet      Reported on 2/27/2017        MELATONIN PO           OMEGA-3 FISH OIL PO           polyethylene glycol powder    MIRALAX    510 g    Take 17 g by mouth daily    Constipation       predniSONE 20 MG tablet    DELTASONE    20 tablet    Take 3 tabs (60 mg) by mouth daily x 3 days, 2 tabs (40 mg) daily x 3 days, 1 tab (20 mg) daily x 3 days, then 1/2 tab (10 mg) x 3 days.    Hand weakness, Bilateral carpal tunnel syndrome, Medial epicondylitis of elbow, right, Medial epicondylitis of elbow, left, Lateral epicondylitis of both elbows       PRENATAL 1 PO      Take  by mouth.        thiamine 100 MG tablet      Take 100 mg by mouth daily.        vitamin D 2000 UNITS tablet     100 tablet    Take 2,000 Units by mouth daily    Unspecified vitamin D deficiency

## 2018-04-03 NOTE — PATIENT INSTRUCTIONS
At [facility], we strive to deliver an exceptional experience to you, every time we see you.  If you receive a survey in the mail, please send us back your thoughts. We really do value your feedback.    Based on your medical history, these are the current health maintenance/preventive care services that you are due for (some may have been done at this visit.)  Health Maintenance Due   Topic Date Due     DEPRESSION SCREENING  03/05/2013     DEPRESSION ACTION PLAN Q1 YR  09/19/2013     TETANUS IMMUNIZATION (SYSTEM ASSIGNED)  01/14/2018         Suggested websites for health information:  Www.Experifun.AllPeers : Up to date and easily searchable information on multiple topics.  Www.My Visual Brief.gov : medication info, interactive tutorials, watch real surgeries online  Www.familydoctor.org : good info from the Academy of Family Physicians  Www.cdc.gov : public health info, travel advisories, epidemics (H1N1)  Www.aap.org : children's health info, normal development, vaccinations  Www.health.[facility].[place].us : [place] dept of health, public health issues in [place], N1N1    Your care team:                            Family Medicine Internal Medicine   MD Chino Cordoba MD Shantel Branch-Fleming, MD Katya Georgiev PA-C Megan Hill, APRN CNP    Angel Klein MD Pediatrics   Paxton Hull, PAJAY Lucas, CNP Ann Koo APRN CNP   MD Lisset Nagy MD Deborah Mielke, MD Kim Thein, APRN [facility]      Clinic hours: Monday - Thursday 7 am-7 pm; Fridays 7 am-5 pm.   Urgent care: Monday - Friday 11 am-9 pm; Saturday and Sunday 9 am-5 pm.  Pharmacy : Monday -Thursday 8 am-8 pm; Friday 8 am-6 pm; Saturday and Sunday 9 am-5 pm.     Clinic: [phone]   Pharmacy: [phone]

## 2018-04-03 NOTE — PROGRESS NOTES
SUBJECTIVE:   Rhys Hernandez is a 42 year old female who presents to clinic today for the following health issues:      Musculoskeletal problem/pain      Duration: x 2/2/18    Description  Location: both wrists    Intensity:  10/10    Accompanying signs and symptoms: numbness, tingling, weakness of hands and pain    History  Previous similar problem: YES  Previous evaluation:  EMG and orthopedic evaluation    Precipitating or alleviating factors:  Trauma or overuse: YES  Aggravating factors include: overuse    Therapies tried and outcome: rest/inactivity, heat, ice, massage, support wrap, chiro and NSAID - Aleve  Patient here to further her care for carpal tunnel and medial and lateral epicondylitis.  She is currently seeing a chiropractor daily and has been since 2/2/18.  Chiropractor is Dr. Gilmar Amaya with [employer].  This is a workman's compensation case.  He has written a letter today stating he'd like this patient to have an EMG but has been unable to get this covered by .  He is also suggesting further medical management for her condition due to suboptimal response to conservative therapies.      Patient's symptoms are related to overuse at work.  She works at a keyboard all day.  She states pain in her wrists and elbows ranges from moderate to severe depending on the day. Pain is worse with repetitive motions (particularly flexion of elbow and and flexion and extension of wrist), holding objects, and with sleep.  She also endorses weakness to the point where she is dropping a lot of objects like coffee cups, grocery bags, and plates while washing dishes.  Originally, had numbness just in first three fingers but now has numbness in all fingers.  She is still working normal hours currently.    Problem list and histories reviewed & adjusted, as indicated.  Additional history: as documented    Patient Active Problem List   Diagnosis     Recurrent kidney stones     Smoker     Anxiety      "CARDIOVASCULAR SCREENING; LDL GOAL LESS THAN 160     Health Care Home     Scoliosis     Bipolar II disorder (H)     Iron deficiency     Excessive and frequent menstruation     Iron deficiency anemia due to chronic blood loss     Vitamin B12 deficiency (non anemic)     Past Surgical History:   Procedure Laterality Date     GASTRIC BYPASS  2005     HC INSERTION INTRAUTERINE DEVICE  2015    Mirena     TUBAL LIGATION  2005       Social History   Substance Use Topics     Smoking status: Current Every Day Smoker     Packs/day: 1.00     Types: Cigarettes     Smokeless tobacco: Never Used     Alcohol use No      Comment: weekly     Family History   Problem Relation Age of Onset     Alcohol/Drug Mother      Depression Mother      Alcohol/Drug Father      Depression Father      Genitourinary Problems Mother      Kidney stones and liver and kidney transplant due to hep c         Allergies   Allergen Reactions     Hydrocodone-Acetaminophen Itching     BP Readings from Last 3 Encounters:   04/03/18 102/69   02/09/18 100/68   02/27/17 97/65    Wt Readings from Last 3 Encounters:   04/03/18 159 lb (72.1 kg)   02/09/18 162 lb 8 oz (73.7 kg)   02/27/17 159 lb 1.6 oz (72.2 kg)          Reviewed and updated as needed this visit by clinical staff  Tobacco  Allergies  Meds       Reviewed and updated as needed this visit by Provider  Allergies  Meds  Problems         ROS:  Constitutional, HEENT, cardiovascular, pulmonary, gi and gu systems are negative, except as otherwise noted.    OBJECTIVE:     /69  Pulse 75  Temp 98.2  F (36.8  C) (Oral)  Ht 5' 5\" (1.651 m)  Wt 159 lb (72.1 kg)  SpO2 98%  Breastfeeding? No  BMI 26.46 kg/m2  Body mass index is 26.46 kg/(m^2).  GENERAL: healthy, alert and no distress  MS: no cyanosis, clubbing, or edema, no edema, peripheral pulses normal, gait normal, no ataxia, RIGHT wrist exam exam shows positive Tinels and Phalens, tenderness over palmar aspect of wrist and at the thenar " eminence, decrease in flexion and extension, lateral movement WNL; decreased  strength (3/5) RIGHT elbow: moderately tender at both lateral and medial epicondyle; and LEFT wrist exam exam shows positive Tinels and Phalens, tenderness over palmar aspect of wrist and at the thenar eminence, decrease in flexion and extension, lateral movement WNL; decreased  strength (3/5) LEFT elbow: moderately tender at both lateral and medial epicondyle  Of note, normal strength in remainder of upper extremities and in lower extremities and normal sensation and circulation throughout upper and lower extremities  SKIN: no suspicious lesions or rashes  NEURO: sensory exam grossly normal, mentation intact, speech normal and gait normal including heel/toe/tandem walking    Diagnostic Test Results:  none     ASSESSMENT/PLAN:     1. Hand weakness  Bilateral due to carpal tunnel.  Needs confirmatory testing.  Failed conservative management as per HPI.  Will do EMG, start prednisone taper (discussed all possible side effects), and send to ortho for consult on injections versus surgery.  Discussed possibility of Gabapentin at next visit. Can continue Naproxen. Patient agreeable to plan.  - NEUROLOGY ADULT REFERRAL  - ORTHOPEDICS ADULT REFERRAL  - predniSONE (DELTASONE) 20 MG tablet; Take 3 tabs (60 mg) by mouth daily x 3 days, 2 tabs (40 mg) daily x 3 days, 1 tab (20 mg) daily x 3 days, then 1/2 tab (10 mg) x 3 days.  Dispense: 20 tablet; Refill: 0    2. Bilateral carpal tunnel syndrome  As above.   - NEUROLOGY ADULT REFERRAL  - ORTHOPEDICS ADULT REFERRAL  - predniSONE (DELTASONE) 20 MG tablet; Take 3 tabs (60 mg) by mouth daily x 3 days, 2 tabs (40 mg) daily x 3 days, 1 tab (20 mg) daily x 3 days, then 1/2 tab (10 mg) x 3 days.  Dispense: 20 tablet; Refill: 0    3. Medial epicondylitis of elbow, right  As above.   - ORTHOPEDICS ADULT REFERRAL  - predniSONE (DELTASONE) 20 MG tablet; Take 3 tabs (60 mg) by mouth daily x 3 days, 2 tabs  (40 mg) daily x 3 days, 1 tab (20 mg) daily x 3 days, then 1/2 tab (10 mg) x 3 days.  Dispense: 20 tablet; Refill: 0    4. Medial epicondylitis of elbow, left  As above.   - ORTHOPEDICS ADULT REFERRAL  - predniSONE (DELTASONE) 20 MG tablet; Take 3 tabs (60 mg) by mouth daily x 3 days, 2 tabs (40 mg) daily x 3 days, 1 tab (20 mg) daily x 3 days, then 1/2 tab (10 mg) x 3 days.  Dispense: 20 tablet; Refill: 0    5. Lateral epicondylitis of both elbows  As above.   - ORTHOPEDICS ADULT REFERRAL  - predniSONE (DELTASONE) 20 MG tablet; Take 3 tabs (60 mg) by mouth daily x 3 days, 2 tabs (40 mg) daily x 3 days, 1 tab (20 mg) daily x 3 days, then 1/2 tab (10 mg) x 3 days.  Dispense: 20 tablet; Refill: 0    6. Work related injury  - ORTHOPEDICS ADULT REFERRAL    7. Alcohol dependence in remission (H)  Patient has been sober for years and makes it clear she wants to avoid any narcotics.  I agree with this.  Assured patient we could do that.      FUTURE APPOINTMENTS:       - Follow-up visit in 2 weeks (with me if not able to see ortho in that time)    Patient Instructions     At [facility], we strive to deliver an exceptional experience to you, every time we see you.  If you receive a survey in the mail, please send us back your thoughts. We really do value your feedback.    Based on your medical history, these are the current health maintenance/preventive care services that you are due for (some may have been done at this visit.)  Health Maintenance Due   Topic Date Due     DEPRESSION SCREENING  03/05/2013     DEPRESSION ACTION PLAN Q1 YR  09/19/2013     TETANUS IMMUNIZATION (SYSTEM ASSIGNED)  01/14/2018         Suggested websites for health information:  Www.Eatwave.org : Up to date and easily searchable information on multiple topics.  Www.medlineplus.gov : medication info, interactive tutorials, watch real surgeries online  Www.familydoctor.org : good info from the Academy of Family  Physicians  Www.cdc.gov : public health info, travel advisories, epidemics (H1N1)  Www.aap.org : children's health info, normal development, vaccinations  Www.health.[facility].[place].us : [place] dept of health, public health issues in [place], N1N1    Your care team:                            Family Medicine Internal Medicine   MD Chino Cordoba MD Shantel Branch-Fleming, MD Katya Georgiev PA-C Megan Hill, MICHELLE Klein MD Pediatrics   GURJIT Ghotra, KHANH Koo APRN MD Lisset Mendoza MD Deborah Mielke, MD Kim Thein, APRN KHANH      Clinic hours: Monday - Thursday 7 am-7 pm; Fridays 7 am-5 pm.   Urgent care: Monday - Friday 11 am-9 pm; Saturday and Sunday 9 am-5 pm.  Pharmacy : Monday -Thursday 8 am-8 pm; Friday 8 am-6 pm; Saturday and Sunday 9 am-5 pm.     Clinic: [phone]   Pharmacy: [phone]          Delmi Lucas, MICHELLE CASSIDY  [facility]

## 2018-04-08 RX ORDER — CYANOCOBALAMIN 1000 UG/ML
1000 INJECTION, SOLUTION INTRAMUSCULAR; SUBCUTANEOUS
Status: CANCELLED
Start: 2018-04-08

## 2018-04-09 ENCOUNTER — TELEPHONE (OUTPATIENT)
Dept: FAMILY MEDICINE | Facility: CLINIC | Age: 43
End: 2018-04-09

## 2018-04-09 NOTE — TELEPHONE ENCOUNTER
Received [employer] FMLA form via fax. Placed in Delmi's office for review.  Chiquita Sue MA/  For Teams [employer] and [employer]era

## 2018-04-09 NOTE — TELEPHONE ENCOUNTER
..Reason for Call:    FMLA paperwork    Detailed comments: [employer]) did not receive forms; Please fax to [employer] @ Fax # [phone], tamara Morrison    Case # [id number]     Phone Number Patient can be reached at: Home number on file [phone] (home)    Best Time: anytime    Can we leave a detailed message on this number? YES    Call taken on 4/9/2018 at 3:17 PM by Noemí Julian

## 2018-04-09 NOTE — TELEPHONE ENCOUNTER
Called and spoke to the patient and explained that we just received this  Paperwork today. Patient understands. I explained that I will fax this as   soon as it is completed.  Chiquita Sue MA/  For Teams Edgar

## 2018-04-10 NOTE — TELEPHONE ENCOUNTER
Faxed completed and signed forms and office visit notes  From 4/3/18 to [employer]abbcl[employer] Morrison, Case # [id number],  [phone], right fax confirmed at 9:46 am today. Copy of  Forms to TC and abstracting. Called and left a voicemail message  Regarding forms faxed and Delmi's message.  Chiquita Sue MA/  For Teams Edgar

## 2018-04-10 NOTE — TELEPHONE ENCOUNTER
Placed completed form and most recent visit note in Chiquita's basket.  Of note, she sees ortho tomorrow so further recommendations may come from Dr. Dickinson for workability.  MICHELLE Russ CNP

## 2018-05-30 ENCOUNTER — OFFICE VISIT (OUTPATIENT)
Dept: FAMILY MEDICINE | Facility: CLINIC | Age: 43
End: 2018-05-30
Payer: OTHER MISCELLANEOUS

## 2018-05-30 VITALS
DIASTOLIC BLOOD PRESSURE: 70 MMHG | TEMPERATURE: 98.1 F | OXYGEN SATURATION: 99 % | WEIGHT: 156 LBS | HEIGHT: 65 IN | BODY MASS INDEX: 25.99 KG/M2 | HEART RATE: 82 BPM | SYSTOLIC BLOOD PRESSURE: 105 MMHG

## 2018-05-30 DIAGNOSIS — D50.8 IRON DEFICIENCY ANEMIA SECONDARY TO INADEQUATE DIETARY IRON INTAKE: ICD-10-CM

## 2018-05-30 DIAGNOSIS — Z72.0 TOBACCO ABUSE: ICD-10-CM

## 2018-05-30 DIAGNOSIS — G56.03 BILATERAL CARPAL TUNNEL SYNDROME: ICD-10-CM

## 2018-05-30 DIAGNOSIS — Z01.818 PREOP GENERAL PHYSICAL EXAM: Primary | ICD-10-CM

## 2018-05-30 LAB
BETA HCG QUAL IFA URINE: NEGATIVE
HGB BLD-MCNC: 14.8 G/DL (ref 11.7–15.7)
POTASSIUM SERPL-SCNC: 4.5 MMOL/L (ref 3.4–5.3)

## 2018-05-30 PROCEDURE — 36415 COLL VENOUS BLD VENIPUNCTURE: CPT | Performed by: NURSE PRACTITIONER

## 2018-05-30 PROCEDURE — 85018 HEMOGLOBIN: CPT | Performed by: NURSE PRACTITIONER

## 2018-05-30 PROCEDURE — 84132 ASSAY OF SERUM POTASSIUM: CPT | Performed by: NURSE PRACTITIONER

## 2018-05-30 PROCEDURE — 84703 CHORIONIC GONADOTROPIN ASSAY: CPT | Performed by: NURSE PRACTITIONER

## 2018-05-30 PROCEDURE — 99215 OFFICE O/P EST HI 40 MIN: CPT | Performed by: NURSE PRACTITIONER

## 2018-05-30 PROCEDURE — 93000 ELECTROCARDIOGRAM COMPLETE: CPT | Performed by: NURSE PRACTITIONER

## 2018-05-30 ASSESSMENT — PAIN SCALES - GENERAL: PAINLEVEL: MODERATE PAIN (4)

## 2018-05-30 NOTE — Clinical Note
Dr. Sanchez, Will you please review and co-sign this pre-op?  [facility] - once Dr. Sanchez has co-signed, please fax to surgeon office and put copy at  for patient to  on Friday  Thank you, Corin

## 2018-05-30 NOTE — PROGRESS NOTES
[address] [address]  [phone]  Dept: [phone]    PRE-OP EVALUATION:  Today's date: 2018    Rhys Hernandez (: 1975) presents for pre-operative evaluation assessment as requested by Dr. Minesh Hill.  She requires evaluation and anesthesia risk assessment prior to undergoing surgery/procedure for treatment of Carpal Tunnel( Both wrists) .    Proposed Surgery/ Procedure: Bilateral Carpal tunnel releases  Date of Surgery/ Procedure: 18  Time of Surgery/ Procedure: unknown  Hospital/Surgical Facility: [facility]  Fax number for surgical facility: [phone]  Primary Physician: Delmi Lucas  Type of Anesthesia Anticipated: to be determined    Patient has a Health Care Directive or Living Will:  NO    1. NO - Do you have a history of heart attack, stroke, stent, bypass or surgery on an artery in the head, neck, heart or legs?  2. NO - Do you ever have any pain or discomfort in your chest?  3. NO - Do you have a history of  Heart Failure?  4. NO - Are you troubled by shortness of breath when: walking on the level, up a slight hill or at night?  5. NO - Do you currently have a cold, bronchitis or other respiratory infection?  6. NO - Do you have a cough, shortness of breath or wheezing?  7. NO - Do you sometimes get pains in the calves of your legs when you walk?  8. NO - Do you or anyone in your family have previous history of blood clots?  9. NO - Do you or does anyone in your family have a serious bleeding problem such as prolonged bleeding following surgeries or cuts?  10. YES - HAVE YOU EVER HAD PROBLEMS WITH ANEMIA OR BEEN TOLD TO TAKE IRON PILLS? Both. Takes daily iron supplement  11. NO - Have you had any abnormal blood loss such as black, tarry or bloody stools, or abnormal vaginal bleeding?  12. NO - Have you ever had a blood transfusion?  13. NO - Have you or any of your relatives ever had  problems with anesthesia?  14. NO - Do you have sleep apnea, excessive snoring or daytime drowsiness?  15. NO - Do you have any prosthetic heart valves?  16. NO - Do you have prosthetic joints?  17. NO - Is there any chance that you may be pregnant?        HPI:     HPI related to upcoming procedure: severe carpal tunnel symptoms for a long time now. Planning to have bilateral release.  will help her at home.      ANEMIA - Patient has a recent history of moderate-severe anemia, which has not been symptomatic. Work up to date has revealed iron deficiency anema due to gastric bypass. Treatment has been oral iron supplementation. Last saw hematology January 2018 and was told to continue same plan of care.                                                                                                                                            .    MEDICAL HISTORY:     Patient Active Problem List    Diagnosis Date Noted     Health Care Home 06/16/2011     Priority: High     Unable to contact: 2/21/12  X  EMERGENCY CARE PLAN  Presenting Problem Signs and Symptoms Treatment Plan    Questions or concerns during clinic hours    I will call the clinic directly     Questions or concerns outside clinic hours    I will call the 24 hour nurse line at [phone]    Patient needs to schedule an appointment    I will call the 24 hour scheduling team at [phone] or clinic directly    Same day treatment     I will call the clinic first, nurse line if after hours, urgent care and express care if needed                              DX V65.8 REPLACED WITH 65030 HEALTH CARE HOME (04/08/2013)       Alcohol dependence in remission (H) 04/03/2018     Priority: Medium     Vitamin B12 deficiency (non anemic) 05/16/2016     Priority: Medium     Iron deficiency anemia due to chronic blood loss 02/01/2016     Priority: Medium     Excessive and frequent menstruation 11/19/2015     Priority: Medium     Bipolar II disorder (H) 08/28/2014      Priority: Medium     Iron deficiency 08/28/2014     Priority: Medium     Scoliosis 03/06/2012     Priority: Medium     CARDIOVASCULAR SCREENING; LDL GOAL LESS THAN 160 10/31/2010     Priority: Medium     Anxiety      Priority: Medium     Recurrent kidney stones 11/12/2009     Priority: Medium     Smoker 11/12/2009     Priority: Medium      Past Medical History:   Diagnosis Date     Alcohol dependence (H)     resolved     Anxiety      Bipolar II disorder 8/28/2014     Kidney stone      Pyelonephritis        Past Surgical History:   Procedure Laterality Date     GASTRIC BYPASS  2005     HC INSERTION INTRAUTERINE DEVICE  2015    Mirena     TUBAL LIGATION  2005     Current Outpatient Prescriptions   Medication Sig Dispense Refill     Cholecalciferol (VITAMIN D) 2000 UNITS tablet Take 2,000 Units by mouth daily 100 tablet 3     Ferrous Sulfate (IRON SUPPLEMENT PO)        MELATONIN PO        Omega-3 Fatty Acids (OMEGA-3 FISH OIL PO)        polyethylene glycol (MIRALAX) powder Take 17 g by mouth daily 510 g 5     Prenatal MV-Min-Fe Fum-FA-DHA (PRENATAL 1 PO) Take  by mouth.       thiamine (VITAMIN B-1) 100 MG tablet Take 100 mg by mouth daily.       VITAMIN B-12 1000 MCG SL SUBL Reported on 2/27/2017       OTC products: herbals and vitamins - melatonin, Vit B12, omega 3, vit D, thiamine, iron; NSAIDS (ibuprofen) and Steroids prednisone 1.5 months ago for wrist pain secondary to carpal tunnel and golfer/tennis elbow    Allergies   Allergen Reactions     Hydrocodone-Acetaminophen Itching      Latex Allergy: NO    Social History   Substance Use Topics     Smoking status: Current Every Day Smoker     Packs/day: 1.00     Types: Cigarettes     Smokeless tobacco: Never Used     Alcohol use No      Comment: last drink 2014     History   Drug Use No     Comment: past alcohol dependence- sober since 7-28-14       REVIEW OF SYSTEMS:   CONSTITUTIONAL: NEGATIVE for fever, chills, change in weight  INTEGUMENTARY/SKIN: NEGATIVE for  "worrisome rashes, moles or lesions  EYES: NEGATIVE for vision changes or irritation  ENT/MOUTH: NEGATIVE for ear, mouth and throat problems  RESP: NEGATIVE for significant cough or SOB  BREAST: NEGATIVE for masses, tenderness or discharge  CV: NEGATIVE for chest pain, palpitations or peripheral edema  GI: NEGATIVE for nausea, abdominal pain, heartburn, or change in bowel habits  : NEGATIVE for frequency, dysuria, or hematuria  MUSCULOSKELETAL: NEGATIVE for significant arthralgias or myalgia  NEURO: NEGATIVE for weakness, dizziness or paresthesias  ENDOCRINE: NEGATIVE for temperature intolerance, skin/hair changes  HEME: NEGATIVE for bleeding problems  PSYCHIATRIC: NEGATIVE for changes in mood or affect    EXAM:   /70 (BP Location: Right arm, Patient Position: Chair, Cuff Size: Adult Regular)  Pulse 82  Temp 98.1  F (36.7  C) (Oral)  Ht 5' 5\" (1.651 m)  Wt 156 lb (70.8 kg)  LMP  (LMP Unknown)  SpO2 99%  Breastfeeding? No  BMI 25.96 kg/m2    GENERAL APPEARANCE: healthy, alert and no distress     EYES: EOMI, PERRL     HENT: ear canals and TM's normal and nose and mouth without ulcers or lesions     NECK: no adenopathy, no asymmetry, masses, or scars and thyroid normal to palpation     RESP: lungs clear to auscultation - no rales, rhonchi or wheezes     CV: regular rates and rhythm, normal S1 S2, no S3 or S4 and no murmur, click or rub     ABDOMEN:  soft, nontender, no HSM or masses and bowel sounds normal     MS: extremities normal- no gross deformities noted, no evidence of inflammation in joints, FROM in all extremities.     SKIN: no suspicious lesions or rashes     NEURO: Normal strength and tone, sensory exam grossly normal, mentation intact and speech normal     PSYCH: mentation appears normal. and affect normal/bright     LYMPHATICS: No cervical adenopathy    DIAGNOSTICS:     Labs Resulted Today:   Results for orders placed or performed in visit on 05/30/18   Hemoglobin   Result Value Ref Range    " Hemoglobin 14.8 11.7 - 15.7 g/dL   Potassium   Result Value Ref Range    Potassium 4.5 3.4 - 5.3 mmol/L   Beta HCG Qual, Urine - [facility] ([id number])   Result Value Ref Range    Beta HCG Qual IFA Urine Negative NEG^Negative      EKG 12-lead complete w/read - Clinics    Narrative    Sinus rhythm. No ectopy       Recent Labs   Lab Test  02/07/18   0722  02/24/17   0727   08/25/15   0741  05/25/11 2029   HGB  15.8*  16.0*   < >  10.1*   --    PLT  253  247   < >  353   --    NA   --    --    --   141  137   POTASSIUM   --    --    --   4.3  3.9   CR   --    --    --   0.79  0.65    < > = values in this interval not displayed.        IMPRESSION:   Reason for surgery/procedure: bilateral carpal tunnel syndrome    The proposed surgical procedure is considered INTERMEDIATE risk.    REVISED CARDIAC RISK INDEX  The patient has the following serious cardiovascular risks for perioperative complications such as (MI, PE, VFib and 3  AV Block):  No serious cardiac risks  INTERPRETATION: 1 risks: Class II (low risk - 0.9% complication rate)    The patient has the following additional risks for perioperative complications:  No identified additional risks (other than current smoker and history of anemia)      ICD-10-CM    1. Preop general physical exam Z01.818 EKG 12-lead complete w/read - Clinics     Hemoglobin     Potassium     Beta HCG Qual, Urine - [facility] ([id number])   2. Bilateral carpal tunnel syndrome G56.03    3. Tobacco abuse Z72.0    4. Iron deficiency anemia secondary to inadequate dietary iron intake D50.8        RECOMMENDATIONS:     --Consult hospital rounder / IM to assist post-op medical management    Anemia  Anemia and does not require treatment prior to surgery.  Monitor Hemoglobin postoperatively.      --Patient is to take all scheduled medications on the day of surgery EXCEPT for modifications listed below.     Anticoagulant or Antiplatelet Medication Use  NSAIDS: Ibuprofen (Motrin):          Stop one day prior to surgery  Stop fish oil now.        APPROVAL GIVEN to proceed with proposed procedure, without further diagnostic evaluation       Signed Electronically by: MICHELLE Espinoza CNP    Copy of this evaluation report is provided to requesting physician.    [facility] Preop Guidelines    Revised Cardiac Risk Index

## 2018-05-30 NOTE — MR AVS SNAPSHOT
After Visit Summary   5/30/2018    Rhys Hernandez    MRN: [id number]           Patient Information     Date Of Birth          1975        Visit Information        Provider Department      5/30/2018 4:20 PM Corin Mcintosh APRN [facility]        Today's Diagnoses     Preop general physical exam    -  1    Bilateral carpal tunnel syndrome          Care Instructions    Stop ibuprofen on Saturday  Stop fish oil now  No medications the morning of surgery  Take all of your normal medications the night before (except the above)    Before Your Surgery      Call your surgeon if there is any change in your health. This includes signs of a cold or flu (such as a sore throat, runny nose, cough, rash or fever).    Do not smoke, drink alcohol or take over the counter medicine (unless your surgeon or primary care doctor tells you to) for the 24 hours before and after surgery.    If you take prescribed drugs: Follow your doctor s orders about which medicines to take and which to stop until after surgery.    Eating and drinking prior to surgery: follow the instructions from your surgeon    Take a shower or bath the night before surgery. Use the soap your surgeon gave you to gently clean your skin. If you do not have soap from your surgeon, use your regular soap. Do not shave or scrub the surgery site.  Wear clean pajamas and have clean sheets on your bed.     HOW TO QUIT SMOKING  Smoking is one of the hardest habits to break. About half of all those who have ever smoked have been able to quit, and most of those (about 70%) who still smoke want to quit. Here are some of the best ways to stop smoking.     KEEP TRYING:  It takes most smokers about 8 tries before they are finally able to fully quit. So, the more often you try and fail, the better your chance of quitting the next time! So, don't give up!    GO COLD TURKEY:  Most ex-smokers quit cold turkey. Trying to cut back gradually  doesn't seem to work as well, perhaps because it continues the smoking habit. Also, it is possible to fool yourself by inhaling more while smoking fewer cigarettes. This results in the same amount of nicotine in your body!    GET SUPPORT:  Support programs can make an important difference, especially for the heavy smoker. These groups offer lectures, methods to change your behavior and peer support. Call the free national Quitline for more information. 800-QUIT-NOW (144-815-8705). Low-cost or free programs are offered by many hospitals, local chapters of the American Lung Association (112-938-7845) and the American Cancer Society (162-464-6416). Support at home is important too. Non-smokers can help by offering praise and encouragement. If the smoker fails to quit, encourage them to try again!    OVER-THE-COUNTER MEDICINES:  For those who can't quit on their own, Nicotine Replacement Therapy (NRT) may make quitting much easier. Certain aids such as the nicotine patch, gum and lozenge are available without a prescription. However, it is best to use these under the guidance of your doctor. The skin patch provides a steady supply of nicotine to the body. Nicotine gum and lozenge gives temporary bursts of low levels of nicotine. Both methods take the edge off the craving for cigarettes. WARNING: If you feel symptoms of nicotine overdose, such as nausea, vomiting, dizziness, weakness, or fast heartbeat, stop using these and see your doctor.    PRESCRIPTION MEDICINES:  After evaluating your smoking patterns and prior attempts at quitting, your doctor may offer a prescription medicine such as bupropion (Zyban, Wellbutrin), varenicline (Chantix, Champix), a niocotine inhaler or nasal spray. Each has its unique advantage and side effects which your doctor can review with you.    HEALTH BENEFITS OF QUITTING:  The benefits of quitting start right away and keep improving the longer you go without smokin minutes: blood  pressure and pulse return to normal  8 hours: oxygen levels return to normal  2 days: ability to smell and taste begins to improve as damaged nerves start to regrow  2-3 weeks: circulation and lung function improves  1-9 months: decreased cough, congestion and shortness of breath; less tired  1 year: risk of heart attack decreases by half  5 years: risk of lung cancer decreases by half; risk of stroke becomes the same as a non-smoker  For information about how to quit smoking, visit the following links:  National Cancer [place] ,   Clearing the Air, Quit Smoking Today   - an online booklet. http://www.smokefree.gov/pubs/clearing_the_air.pdf  Smokefree.gov http://smokefree.gov/  QuitNet http://www.quitnet.com/    9065-4532 Tori Coppola, [address] [place] [address], [place] [address]. All rights reserved. This information is not intended as a substitute for professional medical care. Always follow your healthcare professional's instructions.    The Benefits of Living Smoke Free  What do you want to gain from quitting? Check off some reasons to quit.  Health Benefits  ___ Reduce my risk of lung cancer, heart disease, chronic lung disease  ___ Have fewer wrinkles and softer skin  ___ Improve my sense of taste and smell  ___ For pregnant women--reduce the risk of having a miscarriage, stillbirth, premature birth, or low-birth-weight baby  Personal Benefits  ___ Feel more in control of my life  ___ Have better-smelling hair, breath, clothes, home, and car  ___ Save time by not having to take smoke breaks, buy cigarettes, or hunt for a light  ___ Have whiter teeth  Family Benefits  ___ Reduce my children s respiratory tract infections  ___ Set a good example for my children  ___ Reduce my family s cancer risk  Financial Benefits  ___ Save hundreds of dollars each year that would be spent on cigarettes  ___ Save money on medical bills  ___ Save on life, health, and car insurance premiums    Those Dollars Add Up!  Cigarettes  are expensive, and getting more expensive all the time. Do you realize how much money you are spending on cigarettes per year? What is the average amount you spend on a pack of cigarettes? What is the average number of packs that you smoke per day? Using your answers to these questions, fill in this formula to help you find out:  ($ _____ per pack) ×  ( _____ number of packs per day) × (365 days) =  $ _____ yearly cost of smoking  Besides tobacco, there are other costs, including extra cleaning bills and replacement costs for clothing and furniture; medical expenses for smoking-related illnesses; and higher health, life, and car insurance premiums.    Cigars and Pipes Count Too!  Cigars and pipes are also dangerous. So are smokeless (chewing) tobacco and snuff. All of these products contain nicotine, a highly addictive substance that has harmful effects on your body. Quitting smoking means giving up all tobacco products.      5278-6732 [address], [place] [address]. All rights reserved. This information is not intended as a substitute for professional medical care. Always follow your healthcare professional's instructions.    At [facility], we strive to deliver an exceptional experience to you, every time we see you.  If you receive a survey in the mail, please send us back your thoughts. We really do value your feedback.    Based on your medical history, these are the current health maintenance/preventive care services that you are due for (some may have been done at this visit.)  Health Maintenance Due   Topic Date Due     TOBACCO CESSATION COUNSELING Q1 YR  1975     HIV SCREEN (SYSTEM ASSIGNED)  08/10/1991     DEPRESSION SCREENING  03/05/2013     DEPRESSION ACTION PLAN Q1 YR  09/19/2013     TETANUS IMMUNIZATION (SYSTEM ASSIGNED)  01/14/2018         Suggested websites for health information:  Www.[place].[employer] : Up to date and easily searchable information on  multiple topics.  Www.medlineplus.gov : medication info, interactive tutorials, watch real surgeries online  Www.familydoctor.org : good info from the Academy of Family Physicians  Www.cdc.gov : public health info, travel advisories, epidemics (H1N1)  Www.aap.org : children's health info, normal development, vaccinations  Www.health.[facility].[place].us : [place] dept of health, public health issues in [place], N1N1    Your care team:                            Family Medicine Internal Medicine   MD Chino Cordoba MD Shantel Branch-Fleming, MD Katya Georgiev PA-C Nam Ho, MD Pediatrics   Paxton Hull, GURJIT Lucas, CNP Ann Koo APRN CNP   MD Lisset Nagy MD Deborah Mielke, MD Kim Thein, APRN CNP      Clinic hours: Monday - Thursday 7 am-7 pm; Fridays 7 am-5 pm.   Urgent care: Monday - Friday 11 am-9 pm; Saturday and Sunday 9 am-5 pm.  Pharmacy : Monday -Thursday 8 am-8 pm; Friday 8 am-6 pm; Saturday and Sunday 9 am-5 pm.     Clinic: [phone]   Pharmacy: [phone]            Follow-ups after your visit        Who to contact     If you have questions or need follow up information about today's clinic visit or your schedule please contact [facility] directly at [phone].  Normal or non-critical lab and imaging results will be communicated to you by [employer], letter or phone within 4 business days after the clinic has received the results. If you do not hear from us within 7 days, please contact the clinic through [employer] or phone. If you have a critical or abnormal lab result, we will notify you by phone as soon as possible.  Submit refill requests through [employer] or call your pharmacy and they will forward the refill request to us. Please allow 3 business days for your refill to be completed.          Additional Information About Your Visit        [email or web address] Information     [employer] lets you send messages to your doctor, view your test results,  "renew your prescriptions, schedule appointments and more. To sign up, go to [email or web address] . Click on \"Log in\" on the left side of the screen, which will take you to the Welcome page. Then click on \"Sign up Now\" on the right side of the page.     You will be asked to enter the access code listed below, as well as some personal information. Please follow the directions to create your username and password.     Your access code is: [email or web address]  Expires: 2018  5:12 PM     Your access code will  in 90 days. If you need help or a new code, please call your [place] clinic or [phone].        Care EveryWhere ID     This is your Care EveryWhere ID. This could be used by other organizations to access your [place] medical records  [id number]        Your Vitals Were     Pulse Temperature Height Last Period Pulse Oximetry Breastfeeding?    82 98.1  F (36.7  C) (Oral) 5' 5\" (1.651 m) (LMP Unknown) 99% No    BMI (Body Mass Index)                   25.96 kg/m2            Blood Pressure from Last 3 Encounters:   18 105/70   18 102/69   18 100/68    Weight from Last 3 Encounters:   18 156 lb (70.8 kg)   18 159 lb (72.1 kg)   18 162 lb 8 oz (73.7 kg)              We Performed the Following     Beta HCG Qual, Urine - [facility] ([id number])     EKG 12-lead complete w/read - Clinics     Hemoglobin     Potassium          Today's Medication Changes          These changes are accurate as of 18  5:12 PM.  If you have any questions, ask your nurse or doctor.               Stop taking these medicines if you haven't already. Please contact your care team if you have questions.     predniSONE 20 MG tablet   Commonly known as:  DELTASONE   Stopped by:  Corin Mcintosh APRN CNP                    Primary Care Provider Office Phone # Fax #    MICHELLE Rizo [phone] [address]  [place] [facility] [address]        Equal Access " to Services     NELLI HOGUE : Marybeth Gates, nemo reyes, qapeter montoya. So [facility] [phone].    ATENCIÓN: Si habla español, tiene a drake disposición servicios gratuitos de asistencia lingüística. Llame al [phone].    We comply with applicable federal civil rights laws and [place] laws. We do not discriminate on the basis of race, color, national origin, age, disability, sex, sexual orientation, or gender identity.            Thank you!     Thank you for choosing [facility]  for your care. Our goal is always to provide you with excellent care. Hearing back from our patients is one way we can continue to improve our services. Please take a few minutes to complete the written survey that you may receive in the mail after your visit with us. Thank you!             Your Updated Medication List - Protect others around you: Learn how to safely use, store and throw away your medicines at www.disposemymeds.org.          This list is accurate as of 5/30/18  5:12 PM.  Always use your most recent med list.                   Brand Name Dispense Instructions for use Diagnosis    cyanocobalamin 1000 MCG Subl sublingual tablet      Reported on 2/27/2017        IRON SUPPLEMENT PO           MELATONIN PO           OMEGA-3 FISH OIL PO           polyethylene glycol powder    MIRALAX    510 g    Take 17 g by mouth daily    Constipation       PRENATAL 1 PO      Take  by mouth.        thiamine 100 MG tablet      Take 100 mg by mouth daily.        vitamin D 2000 units tablet     100 tablet    Take 2,000 Units by mouth daily    Unspecified vitamin D deficiency

## 2018-05-30 NOTE — PATIENT INSTRUCTIONS
Stop ibuprofen on Saturday  Stop fish oil now  No medications the morning of surgery  Take all of your normal medications the night before (except the above)    Before Your Surgery      Call your surgeon if there is any change in your health. This includes signs of a cold or flu (such as a sore throat, runny nose, cough, rash or fever).    Do not smoke, drink alcohol or take over the counter medicine (unless your surgeon or primary care doctor tells you to) for the 24 hours before and after surgery.    If you take prescribed drugs: Follow your doctor s orders about which medicines to take and which to stop until after surgery.    Eating and drinking prior to surgery: follow the instructions from your surgeon    Take a shower or bath the night before surgery. Use the soap your surgeon gave you to gently clean your skin. If you do not have soap from your surgeon, use your regular soap. Do not shave or scrub the surgery site.  Wear clean pajamas and have clean sheets on your bed.     HOW TO QUIT SMOKING  Smoking is one of the hardest habits to break. About half of all those who have ever smoked have been able to quit, and most of those (about 70%) who still smoke want to quit. Here are some of the best ways to stop smoking.     KEEP TRYING:  It takes most smokers about 8 tries before they are finally able to fully quit. So, the more often you try and fail, the better your chance of quitting the next time! So, don't give up!    GO COLD TURKEY:  Most ex-smokers quit cold turkey. Trying to cut back gradually doesn't seem to work as well, perhaps because it continues the smoking habit. Also, it is possible to fool yourself by inhaling more while smoking fewer cigarettes. This results in the same amount of nicotine in your body!    GET SUPPORT:  Support programs can make an important difference, especially for the heavy smoker. These groups offer lectures, methods to change your behavior and peer support. Call the free  national Quitline for more information. 800-QUIT-NOW (574-395-1443). Low-cost or free programs are offered by many hospitals, local chapters of the American Lung Association (304-212-1967) and the American Cancer Society (012-531-5655). Support at home is important too. Non-smokers can help by offering praise and encouragement. If the smoker fails to quit, encourage them to try again!    OVER-THE-COUNTER MEDICINES:  For those who can't quit on their own, Nicotine Replacement Therapy (NRT) may make quitting much easier. Certain aids such as the nicotine patch, gum and lozenge are available without a prescription. However, it is best to use these under the guidance of your doctor. The skin patch provides a steady supply of nicotine to the body. Nicotine gum and lozenge gives temporary bursts of low levels of nicotine. Both methods take the edge off the craving for cigarettes. WARNING: If you feel symptoms of nicotine overdose, such as nausea, vomiting, dizziness, weakness, or fast heartbeat, stop using these and see your doctor.    PRESCRIPTION MEDICINES:  After evaluating your smoking patterns and prior attempts at quitting, your doctor may offer a prescription medicine such as bupropion (Zyban, Wellbutrin), varenicline (Chantix, Champix), a niocotine inhaler or nasal spray. Each has its unique advantage and side effects which your doctor can review with you.    HEALTH BENEFITS OF QUITTING:  The benefits of quitting start right away and keep improving the longer you go without smokin minutes: blood pressure and pulse return to normal  8 hours: oxygen levels return to normal  2 days: ability to smell and taste begins to improve as damaged nerves start to regrow  2-3 weeks: circulation and lung function improves  1-9 months: decreased cough, congestion and shortness of breath; less tired  1 year: risk of heart attack decreases by half  5 years: risk of lung cancer decreases by half; risk of stroke becomes the  same as a non-smoker  For information about how to quit smoking, visit the following links:  National Cancer Lillie ,   Clearing the Air, Quit Smoking Today   - an online booklet. http://www.smokefree.gov/pubs/clearing_the_air.pdf  Smokefree.gov http://smokefree.gov/  QuitNet http://www.quitnet.com/    9186-4858 Tori Coppola, [address] [place] [address], [place] [address]. All rights reserved. This information is not intended as a substitute for professional medical care. Always follow your healthcare professional's instructions.    The Benefits of Living Smoke Free  What do you want to gain from quitting? Check off some reasons to quit.  Health Benefits  ___ Reduce my risk of lung cancer, heart disease, chronic lung disease  ___ Have fewer wrinkles and softer skin  ___ Improve my sense of taste and smell  ___ For pregnant women--reduce the risk of having a miscarriage, stillbirth, premature birth, or low-birth-weight baby  Personal Benefits  ___ Feel more in control of my life  ___ Have better-smelling hair, breath, clothes, home, and car  ___ Save time by not having to take smoke breaks, buy cigarettes, or hunt for a light  ___ Have whiter teeth  Family Benefits  ___ Reduce my children s respiratory tract infections  ___ Set a good example for my children  ___ Reduce my family s cancer risk  Financial Benefits  ___ Save hundreds of dollars each year that would be spent on cigarettes  ___ Save money on medical bills  ___ Save on life, health, and car insurance premiums    Those Dollars Add Up!  Cigarettes are expensive, and getting more expensive all the time. Do you realize how much money you are spending on cigarettes per year? What is the average amount you spend on a pack of cigarettes? What is the average number of packs that you smoke per day? Using your answers to these questions, fill in this formula to help you find out:  ($ _____ per pack) ×  ( _____ number of packs per day) × (365 days) =  $ _____  yearly cost of smoking  Besides tobacco, there are other costs, including extra cleaning bills and replacement costs for clothing and furniture; medical expenses for smoking-related illnesses; and higher health, life, and car insurance premiums.    Cigars and Pipes Count Too!  Cigars and pipes are also dangerous. So are smokeless (chewing) tobacco and snuff. All of these products contain nicotine, a highly addictive substance that has harmful effects on your body. Quitting smoking means giving up all tobacco products.      6976-1057 [facility], [address] [place] [address], [place] [address]. All rights reserved. This information is not intended as a substitute for professional medical care. Always follow your healthcare professional's instructions.    At [facility], we strive to deliver an exceptional experience to you, every time we see you.  If you receive a survey in the mail, please send us back your thoughts. We really do value your feedback.    Based on your medical history, these are the current health maintenance/preventive care services that you are due for (some may have been done at this visit.)  Health Maintenance Due   Topic Date Due     TOBACCO CESSATION COUNSELING Q1 YR  1973     HIV SCREEN (SYSTEM ASSIGNED)  08/10/1991     DEPRESSION SCREENING  03/05/2013     DEPRESSION ACTION PLAN Q1 YR  09/19/2013     TETANUS IMMUNIZATION (SYSTEM ASSIGNED)  01/14/2018         Suggested websites for health information:  Www.[facility].org : Up to date and easily searchable information on multiple topics.  Www.medlineplus.gov : medication info, interactive tutorials, watch real surgeries online  Www.familydoctor.org : good info from the Academy of Family Physicians  Www.cdc.gov : public health info, travel advisories, epidemics (H1N1)  Www.aap.org : children's health info, normal development, vaccinations  Www.health.state.[place].us : [place] dept of health, public health issues in [place], N1N1    Your  care team:                            Family Medicine Internal Medicine   MD Chino Codroba MD Shantel Branch-Fleming, MD Katya Georgiev PA-C Nam Ho, MD Pediatrics   GURJIT Ghotra, MD Lisset Morales CNP, MD Deborah Mielke, MD Lupe Kwok, APRN [facility]      Clinic hours: Monday - Thursday 7 am-7 pm; Fridays 7 am-5 pm.   Urgent care: Monday - Friday 11 am-9 pm; Saturday and Sunday 9 am-5 pm.  Pharmacy : Monday -Thursday 8 am-8 pm; Friday 8 am-6 pm; Saturday and Sunday 9 am-5 pm.     Clinic: [phone]   Pharmacy: [phone]

## 2018-06-01 ENCOUNTER — TELEPHONE (OUTPATIENT)
Dept: FAMILY MEDICINE | Facility: CLINIC | Age: 43
End: 2018-06-01

## 2018-06-01 NOTE — TELEPHONE ENCOUNTER
Looks like Corin Mcintosh did the pre-op physical on 5/30/18,  Routing to the appropriate team.  Chiquita Sue MA/  For [employer] [facility]

## 2018-06-01 NOTE — TELEPHONE ENCOUNTER
...Reason for Call:  Other     Detailed comments: Nurse called from surgery center at [facility], she said she need the patient's pre-op and any labs. Please fax to [phone]    Phone Number Patient can be reached at: Home number on file [phone] (home)    Best Time: anytime    Can we leave a detailed message on this number? YES    Call taken on 6/1/2018 at 9:34 AM by Verna Mario

## 2018-06-01 NOTE — PROGRESS NOTES
Pre-op, lab and EKG printed and faxed.  Carlton Posey,  For [facility]    Put a copy of the pre-op at the  for patient to pickup today.  Carlton Posey,  For [employer]

## 2018-06-04 NOTE — PROGRESS NOTES
Chart reviewed. Agree with assessment and plan. Approved to proceed with procedure and anesthesia without further work up or referral.  Meme Sanchez MD

## 2018-08-27 ENCOUNTER — OFFICE VISIT (OUTPATIENT)
Dept: URGENT CARE | Facility: URGENT CARE | Age: 43
End: 2018-08-27
Payer: COMMERCIAL

## 2018-08-27 VITALS
OXYGEN SATURATION: 96 % | TEMPERATURE: 98.2 F | HEART RATE: 96 BPM | BODY MASS INDEX: 25.39 KG/M2 | DIASTOLIC BLOOD PRESSURE: 71 MMHG | WEIGHT: 152.6 LBS | SYSTOLIC BLOOD PRESSURE: 107 MMHG

## 2018-08-27 DIAGNOSIS — N76.0 VAGINITIS AND VULVOVAGINITIS: ICD-10-CM

## 2018-08-27 DIAGNOSIS — H60.393 INFECTIVE OTITIS EXTERNA, BILATERAL: ICD-10-CM

## 2018-08-27 DIAGNOSIS — J01.90 ACUTE SINUSITIS WITH COEXISTING CONDITION REQUIRING PROPHYLACTIC TREATMENT: Primary | ICD-10-CM

## 2018-08-27 PROCEDURE — 99214 OFFICE O/P EST MOD 30 MIN: CPT | Performed by: FAMILY MEDICINE

## 2018-08-27 RX ORDER — FLUTICASONE PROPIONATE 50 MCG
1-2 SPRAY, SUSPENSION (ML) NASAL DAILY
Qty: 1 BOTTLE | Refills: 0 | Status: SHIPPED | OUTPATIENT
Start: 2018-08-27 | End: 2018-09-10

## 2018-08-27 RX ORDER — FLUCONAZOLE 150 MG/1
150 TABLET ORAL ONCE
Qty: 1 TABLET | Refills: 0 | Status: SHIPPED | OUTPATIENT
Start: 2018-08-27 | End: 2018-08-27

## 2018-08-27 RX ORDER — NEOMYCIN SULFATE, POLYMYXIN B SULFATE AND HYDROCORTISONE 10; 3.5; 1 MG/ML; MG/ML; [USP'U]/ML
4 SUSPENSION/ DROPS AURICULAR (OTIC) 3 TIMES DAILY
Qty: 10 ML | Refills: 0 | Status: SHIPPED | OUTPATIENT
Start: 2018-08-27 | End: 2018-09-03

## 2018-08-27 NOTE — PROGRESS NOTES
SUBJECTIVE:   Rhys Hernandez is a 43 year old female presenting with a chief complaint of sinus congestion and bilateral ear pain.  Endorsed fever.  Has sinus infections in the past, last one was couple of years ago.  Endorsed headaches with purulent rhinits.  Ears very itchy initially.  Feeling more run down.  Onset of symptoms was 3 day(s) ago.  Course of illness is worsening.    Severity moderate  Current and Associated symptoms: bilateral ear pain, sinus pressure and congestion  Treatment measures tried include : sudafed, Fluids and Rest.  Predisposing factors include tobacco use.    Patient is interested in TOB cessation.  Requesting antibiotic for yeast infections, states that will get vaginitis after antibiotic use.    Past Medical History:   Diagnosis Date     Alcohol dependence (H)     resolved     Anxiety      Bipolar II disorder 8/28/2014     Kidney stone      Pyelonephritis      Current Outpatient Prescriptions   Medication Sig Dispense Refill     Cholecalciferol (VITAMIN D) 2000 UNITS tablet Take 2,000 Units by mouth daily 100 tablet 3     Ferrous Sulfate (IRON SUPPLEMENT PO)        MELATONIN PO        Omega-3 Fatty Acids (OMEGA-3 FISH OIL PO)        polyethylene glycol (MIRALAX) powder Take 17 g by mouth daily 510 g 5     Prenatal MV-Min-Fe Fum-FA-DHA (PRENATAL 1 PO) Take  by mouth.       thiamine (VITAMIN B-1) 100 MG tablet Take 100 mg by mouth daily.       VITAMIN B-12 1000 MCG SL SUBL Reported on 2/27/2017       Social History   Substance Use Topics     Smoking status: Current Every Day Smoker     Packs/day: 1.00     Types: Cigarettes     Smokeless tobacco: Never Used     Alcohol use No      Comment: last drink 2014       ROS:  Review of systems negative except as stated above.    OBJECTIVE:  /71 (BP Location: Left arm, Patient Position: Chair, Cuff Size: Adult Regular)  Pulse 96  Temp 98.2  F (36.8  C) (Oral)  Wt 152 lb 9.6 oz (69.2 kg)  SpO2 96%  BMI 25.39 kg/m2  GENERAL APPEARANCE:  healthy, alert and no distress  EYES: EOMI,  PERRL, conjunctiva clear  HENT: ear canals - mildly irritated.  Bilateral TM's normal.  Nose and mouth without ulcers, erythema or lesions.  Frontal and maxillary sinuses tenderness  NECK: supple, nontender, no lymphadenopathy  RESP: lungs clear to auscultation - no rales, rhonchi or wheezes  CV: regular rates and rhythm, normal S1 S2, no murmur noted  Extremities: no peripheral edema or tenderness, peripheral pulses normal  SKIN: no suspicious lesions or rashes  PSYCH: mentation appears normal and affect normal/bright    ASSESSMENT/PLAN:  (J01.90) Acute sinusitis with coexisting condition requiring prophylactic treatment  (primary encounter diagnosis)  Plan: amoxicillin-clavulanate (AUGMENTIN) 875-125 MG         per tablet, fluticasone (FLONASE) 50 MCG/ACT         spray            (H60.393) Infective otitis externa, bilateral  Plan: neomycin-polymyxin-hydrocortisone (CORTISPORIN)        3.5-62225-4 otic suspension            (N76.0) Vaginitis and vulvovaginitis  Comment: post antibiotic  Plan: fluconazole (DIFLUCAN) 150 MG tablet            Reassurance given, reviewed symptomatic treatment, plenty of fluids and rest.  RX Augmentin given for worsening sinus infection, encourage to continue with netti pot and sudafed.  RX flonase given.  Reviewed that ear symptoms due to otitis externa, encourage to refrain from using Q-tips in ears, RX cortisporin otic given.  RX diflucan given for yeast infection after antibiotic use.    Encourage to make appointment with clinical pharmacist for MTM smoking cessation.    Return to clinic if no resolution of symptoms.    Jay Small MD  August 27, 2018 12:08 PM

## 2018-08-27 NOTE — MR AVS SNAPSHOT
After Visit Summary   8/27/2018    Rhys Hernandez    MRN: [id number]           Patient Information     Date Of Birth          1975        Visit Information        Provider Department      8/27/2018 11:05 AM Jay Small MD [facility]        Today's Diagnoses     Acute sinusitis with coexisting condition requiring prophylactic treatment    -  1    Infective otitis externa, bilateral        Vaginitis and vulvovaginitis          Care Instructions    Take full course of antibiotic for sinus infection.  Okay to take ibuprofen 200 mg - 4 tablets (800 mg) every 8 hours as needed.  Okay to take tylenol 500 mg - 2 tablets (1000 mg) every 6-8 hours as needed, do not exceed 3000 mg in 24 hours.  Use flonase for sinus congestion.  Continue with sudafed, consider netti pot.  Use cortisporin otic drops to help with swimmer's ear.    Make appointment with MTM (clinical pharmacist) for smoking cessation.      Sinusitis (Antibiotic Treatment)    The sinuses are air-filled spaces within the bones of the face. They connect to the inside of the nose. Sinusitis is an inflammation of the tissue that lines the sinuses. Sinusitis can occur during a cold. It can also happen due to allergies to pollens and other particles in the air. Sinusitis can cause symptoms of sinus congestion and a feeling of fullness. A sinus infection causes fever, headache, and facial pain. There is often green or yellow fluid draining from the nose or into the back of the throat (post-nasal drip). You have been given antibiotics to treat this condition.  Home care    Take the full course of antibiotics as instructed. Do not stop taking them, even when you feel better.    Drink plenty of water, hot tea, and other liquids. This may help thin nasal mucus. It also may help your sinuses drain fluids.    Heat may help soothe painful areas of your face. Use a towel soaked in hot water. Or,  the shower and direct the warm  spray onto your face. Using a vaporizer along with a menthol rub at night may also help soothe symptoms.     An expectorant with guaifenesin may help thin nasal mucus and help your sinuses drain fluids.    You can use an over-the-counter decongestant, unless a similar medicine was prescribed to you. Nasal sprays work the fastest. Use one that contains phenylephrine or oxymetazoline. First blow your nose gently. Then use the spray. Do not use these medicines more often than directed on the label. If you do, your symptoms may get worse. You may also take pills that contain pseudoephedrine. Don t use products that combine multiple medicines. This is because side effects may be increased. Read labels. You can also ask the pharmacist for help. (People with high blood pressure should not use decongestants. They can raise blood pressure.)    Over-the-counter antihistamines may help if allergies contributed to your sinusitis.      Do not use nasal rinses or irrigation during an acute sinus infection, unless your healthcare provider tells you to. Rinsing may spread the infection to other areas in your sinuses.    Use acetaminophen or ibuprofen to control pain, unless another pain medicine was prescribed to you. If you have chronic liver or kidney disease or ever had a stomach ulcer, talk with your healthcare provider before using these medicines. (Aspirin should never be taken by anyone under age 18 who is ill with a fever. It may cause severe liver damage.)    Don't smoke. This can make symptoms worse.  Follow-up care  Follow up with your healthcare provider or our staff if you are better in 1 week.  When to seek medical advice  Call your healthcare provider if any of these occur:    Facial pain or headache that gets worse    Stiff neck    Unusual drowsiness or confusion    Swelling of your forehead or eyelids    Vision problems, such as blurred or double vision    Fever of 100.4 F (38 C) or higher, or as directed by your  healthcare provider    Seizure    Breathing problems    Symptoms don't go away in 10 days  Prevention  Here are steps you can take to help prevent an infection:    Keep good hand washing habits.    Don t have close contact with people who have sore throats, colds, or other upper respiratory infections.    Don t smoke, and stay away from secondhand smoke.    Stay up to date with of your vaccines.  Date Last Reviewed: 11/1/2017 2000-2017 The [employer]. [address] [place] [address], [place] [address]. All rights reserved. This information is not intended as a substitute for professional medical care. Always follow your healthcare professional's instructions.        External Ear Infection (Adult)    External otitis (also called  swimmer s ear ) is an infection in the ear canal. It is often caused by bacteria or fungus. It can occur a few days after water gets trapped in the ear canal (from swimming or bathing). It can also occur after cleaning too deeply in the ear canal with a cotton swab or other object. Sometimes, hair care products get into the ear canal and cause this problem.  Symptoms can include pain, fever, itching, redness, drainage, or swelling of the ear canal. Temporary hearing loss may also occur.  Home care    Do not try to clean the ear canal. This can push pus and bacteria deeper into the canal.    Use prescribed ear drops as directed. These help reduce swelling and fight the infection. If an ear wick was placed in the ear canal, apply drops right onto the end of the wick. The wick will draw the medicine into the ear canal even if it is swollen closed.    A cotton ball may be loosely placed in the outer ear to absorb any drainage.    You may use acetaminophen or ibuprofen to control pain, unless another medicine was prescribed. Note: If you have chronic liver or kidney disease or ever had a stomach ulcer or GI bleeding, talk to your healthcare provider before taking any of these  medicines.    Do not allow water to get into your ear when bathing. Also, don't swim until the infection has cleared.  Prevention    Keep your ears dry. This helps lower the risk of infection. Dry your ears with a towel or hair dryer after getting wet. Also, use ear plugs when swimming.    Do not stick any objects in the ear to remove wax.    If you feel water trapped in your ear, use ear drops right away. You can get these drops over the counter at most drugstores. They work by removing water from the ear canal.  Follow-up care  Follow up with your healthcare provider in 1 week, or as advised.  When to seek medical advice  Call your healthcare provider right away if any of these occur:    Ear pain becomes worse or doesn t improve after 3 days of treatment    Redness or swelling of the outer ear occurs or gets worse    Headache    Painful or stiff neck    Drowsiness or confusion    Fever of 100.4 F (38 C) or higher, or as directed by your healthcare provider    Seizure  Date Last Reviewed: 10/1/2017    7992-3101 [employer]. [address] [place] [address]. All rights reserved. This information is not intended as a substitute for professional medical care. Always follow your healthcare professional's instructions.                Follow-ups after your visit        Who to contact     If you have questions or need follow up information about today's clinic visit or your schedule please contact [facility] directly at [phone].  Normal or non-critical lab and imaging results will be communicated to you by MyChart, letter or phone within 4 business days after the clinic has received the results. If you do not hear from us within 7 days, please contact the clinic through MyChart or phone. If you have a critical or abnormal lab result, we will notify you by phone as soon as possible.  Submit refill requests through [employer] or call your pharmacy and they will forward the refill  request to us. Please allow 3 business days for your refill to be completed.          Additional Information About Your Visit        Care EveryWhere ID     This is your Care EveryWhere ID. This could be used by other organizations to access your [facility] medical records  [id number]        Your Vitals Were     Pulse Temperature Pulse Oximetry BMI (Body Mass Index)          96 98.2  F (36.8  C) (Oral) 96% 25.39 kg/m2         Blood Pressure from Last 3 Encounters:   08/27/18 107/71   05/30/18 105/70   04/03/18 102/69    Weight from Last 3 Encounters:   08/27/18 152 lb 9.6 oz (69.2 kg)   05/30/18 156 lb (70.8 kg)   04/03/18 159 lb (72.1 kg)              Today, you had the following     No orders found for display         Today's Medication Changes          These changes are accurate as of 8/27/18 12:01 PM.  If you have any questions, ask your nurse or doctor.               Start taking these medicines.        Dose/Directions    amoxicillin-clavulanate 875-125 MG per tablet   Commonly known as:  AUGMENTIN   Used for:  Acute sinusitis with coexisting condition requiring prophylactic treatment   Started by:  Jay Small MD        Dose:  1 tablet   Take 1 tablet by mouth 2 times daily   Quantity:  20 tablet   Refills:  0       fluconazole 150 MG tablet   Commonly known as:  DIFLUCAN   Used for:  Vaginitis and vulvovaginitis   Started by:  Jay Small MD        Dose:  150 mg   Take 1 tablet (150 mg) by mouth once for 1 dose   Quantity:  1 tablet   Refills:  0       fluticasone 50 MCG/ACT spray   Commonly known as:  FLONASE   Used for:  Acute sinusitis with coexisting condition requiring prophylactic treatment   Started by:  Jay Small MD        Dose:  1-2 spray   Spray 1-2 sprays into both nostrils daily   Quantity:  1 Bottle   Refills:  0       neomycin-polymyxin-hydrocortisone 3.5-88304-1 otic suspension   Commonly known as:  CORTISPORIN   Used for:  Infective otitis externa, bilateral   Started by:  Jay Small MD         Dose:  4 drop   Place 4 drops into both ears 3 times daily for 7 days   Quantity:  10 mL   Refills:  0            Where to get your medicines      These medications were sent to [facility] [address] [facility] [place] - [address] [facility]  [address] [facility] [address]     Phone:  [phone]     amoxicillin-clavulanate 875-125 MG per tablet    fluconazole 150 MG tablet    fluticasone 50 MCG/ACT spray    neomycin-polymyxin-hydrocortisone 3.5-38329-1 otic suspension                Primary Care Provider Office Phone # Fax #    Delmi Lucas, APRN [phone]       [address]  [facility] [address]        Equal Access to Services     NELLI HOGUE : Hadii tara sow hadasho Soomaali, waaxda luqadaha, qaybta kaalmada adeegyada, waxay yesikain hayekaterina alcala . So [facility] [phone].    ATENCIÓN: Si habla español, tiene a drake disposición servicios gratuitos de asistencia lingüística. Llame al [phone].    We comply with applicable federal civil rights laws and [place] laws. We do not discriminate on the basis of race, color, national origin, age, disability, sex, sexual orientation, or gender identity.            Thank you!     Thank you for choosing [facility]  for your care. Our goal is always to provide you with excellent care. Hearing back from our patients is one way we can continue to improve our services. Please take a few minutes to complete the written survey that you may receive in the mail after your visit with us. Thank you!             Your Updated Medication List - Protect others around you: Learn how to safely use, store and throw away your medicines at www.disposemymeds.org.          This list is accurate as of 8/27/18 12:01 PM.  Always use your most recent med list.                   Brand Name Dispense Instructions for use Diagnosis    amoxicillin-clavulanate 875-125 MG per tablet    AUGMENTIN    20 tablet    Take 1  tablet by mouth 2 times daily    Acute sinusitis with coexisting condition requiring prophylactic treatment       cyanocobalamin 1000 MCG Subl sublingual tablet      Reported on 2/27/2017        fluconazole 150 MG tablet    DIFLUCAN    1 tablet    Take 1 tablet (150 mg) by mouth once for 1 dose    Vaginitis and vulvovaginitis       fluticasone 50 MCG/ACT spray    FLONASE    1 Bottle    Spray 1-2 sprays into both nostrils daily    Acute sinusitis with coexisting condition requiring prophylactic treatment       IRON SUPPLEMENT PO           MELATONIN PO           neomycin-polymyxin-hydrocortisone 3.5-15238-6 otic suspension    CORTISPORIN    10 mL    Place 4 drops into both ears 3 times daily for 7 days    Infective otitis externa, bilateral       OMEGA-3 FISH OIL PO           polyethylene glycol powder    MIRALAX    510 g    Take 17 g by mouth daily    Constipation       PRENATAL 1 PO      Take  by mouth.        thiamine 100 MG tablet      Take 100 mg by mouth daily.        vitamin D 2000 units tablet     100 tablet    Take 2,000 Units by mouth daily    Unspecified vitamin D deficiency

## 2018-08-27 NOTE — PATIENT INSTRUCTIONS
Take full course of antibiotic for sinus infection.  Okay to take ibuprofen 200 mg - 4 tablets (800 mg) every 8 hours as needed.  Okay to take tylenol 500 mg - 2 tablets (1000 mg) every 6-8 hours as needed, do not exceed 3000 mg in 24 hours.  Use flonase for sinus congestion.  Continue with sudafed, consider netti pot.  Use cortisporin otic drops to help with swimmer's ear.    Make appointment with MTM (clinical pharmacist) for smoking cessation.      Sinusitis (Antibiotic Treatment)    The sinuses are air-filled spaces within the bones of the face. They connect to the inside of the nose. Sinusitis is an inflammation of the tissue that lines the sinuses. Sinusitis can occur during a cold. It can also happen due to allergies to pollens and other particles in the air. Sinusitis can cause symptoms of sinus congestion and a feeling of fullness. A sinus infection causes fever, headache, and facial pain. There is often green or yellow fluid draining from the nose or into the back of the throat (post-nasal drip). You have been given antibiotics to treat this condition.  Home care    Take the full course of antibiotics as instructed. Do not stop taking them, even when you feel better.    Drink plenty of water, hot tea, and other liquids. This may help thin nasal mucus. It also may help your sinuses drain fluids.    Heat may help soothe painful areas of your face. Use a towel soaked in hot water. Or,  the shower and direct the warm spray onto your face. Using a vaporizer along with a menthol rub at night may also help soothe symptoms.     An expectorant with guaifenesin may help thin nasal mucus and help your sinuses drain fluids.    You can use an over-the-counter decongestant, unless a similar medicine was prescribed to you. Nasal sprays work the fastest. Use one that contains phenylephrine or oxymetazoline. First blow your nose gently. Then use the spray. Do not use these medicines more often than directed on the  label. If you do, your symptoms may get worse. You may also take pills that contain pseudoephedrine. Don t use products that combine multiple medicines. This is because side effects may be increased. Read labels. You can also ask the pharmacist for help. (People with high blood pressure should not use decongestants. They can raise blood pressure.)    Over-the-counter antihistamines may help if allergies contributed to your sinusitis.      Do not use nasal rinses or irrigation during an acute sinus infection, unless your healthcare provider tells you to. Rinsing may spread the infection to other areas in your sinuses.    Use acetaminophen or ibuprofen to control pain, unless another pain medicine was prescribed to you. If you have chronic liver or kidney disease or ever had a stomach ulcer, talk with your healthcare provider before using these medicines. (Aspirin should never be taken by anyone under age 18 who is ill with a fever. It may cause severe liver damage.)    Don't smoke. This can make symptoms worse.  Follow-up care  Follow up with your healthcare provider or our staff if you are better in 1 week.  When to seek medical advice  Call your healthcare provider if any of these occur:    Facial pain or headache that gets worse    Stiff neck    Unusual drowsiness or confusion    Swelling of your forehead or eyelids    Vision problems, such as blurred or double vision    Fever of 100.4 F (38 C) or higher, or as directed by your healthcare provider    Seizure    Breathing problems    Symptoms don't go away in 10 days  Prevention  Here are steps you can take to help prevent an infection:    Keep good hand washing habits.    Don t have close contact with people who have sore throats, colds, or other upper respiratory infections.    Don t smoke, and stay away from secondhand smoke.    Stay up to date with of your vaccines.  Date Last Reviewed: 11/1/2017 2000-2017 [employer]. [address] [facility],  [place] [address]. All rights reserved. This information is not intended as a substitute for professional medical care. Always follow your healthcare professional's instructions.        External Ear Infection (Adult)    External otitis (also called  swimmer s ear ) is an infection in the ear canal. It is often caused by bacteria or fungus. It can occur a few days after water gets trapped in the ear canal (from swimming or bathing). It can also occur after cleaning too deeply in the ear canal with a cotton swab or other object. Sometimes, hair care products get into the ear canal and cause this problem.  Symptoms can include pain, fever, itching, redness, drainage, or swelling of the ear canal. Temporary hearing loss may also occur.  Home care    Do not try to clean the ear canal. This can push pus and bacteria deeper into the canal.    Use prescribed ear drops as directed. These help reduce swelling and fight the infection. If an ear wick was placed in the ear canal, apply drops right onto the end of the wick. The wick will draw the medicine into the ear canal even if it is swollen closed.    A cotton ball may be loosely placed in the outer ear to absorb any drainage.    You may use acetaminophen or ibuprofen to control pain, unless another medicine was prescribed. Note: If you have chronic liver or kidney disease or ever had a stomach ulcer or GI bleeding, talk to your healthcare provider before taking any of these medicines.    Do not allow water to get into your ear when bathing. Also, don't swim until the infection has cleared.  Prevention    Keep your ears dry. This helps lower the risk of infection. Dry your ears with a towel or hair dryer after getting wet. Also, use ear plugs when swimming.    Do not stick any objects in the ear to remove wax.    If you feel water trapped in your ear, use ear drops right away. You can get these drops over the counter at most drugstores. They work by removing water from the  ear canal.  Follow-up care  Follow up with your healthcare provider in 1 week, or as advised.  When to seek medical advice  Call your healthcare provider right away if any of these occur:    Ear pain becomes worse or doesn t improve after 3 days of treatment    Redness or swelling of the outer ear occurs or gets worse    Headache    Painful or stiff neck    Drowsiness or confusion    Fever of 100.4 F (38 C) or higher, or as directed by your healthcare provider    Seizure  Date Last Reviewed: 10/1/2017    0947-6724 [employer]. [address] [place] [address]. All rights reserved. This information is not intended as a substitute for professional medical care. Always follow your healthcare professional's instructions.

## 2018-09-10 ENCOUNTER — OFFICE VISIT (OUTPATIENT)
Dept: PHARMACY | Facility: CLINIC | Age: 43
End: 2018-09-10
Payer: COMMERCIAL

## 2018-09-10 VITALS — DIASTOLIC BLOOD PRESSURE: 63 MMHG | SYSTOLIC BLOOD PRESSURE: 95 MMHG

## 2018-09-10 DIAGNOSIS — M25.522 PAIN OF BOTH ELBOWS: ICD-10-CM

## 2018-09-10 DIAGNOSIS — M25.521 PAIN OF BOTH ELBOWS: ICD-10-CM

## 2018-09-10 DIAGNOSIS — K59.00 CONSTIPATION, UNSPECIFIED CONSTIPATION TYPE: ICD-10-CM

## 2018-09-10 DIAGNOSIS — Z98.84 HISTORY OF GASTRIC BYPASS: ICD-10-CM

## 2018-09-10 DIAGNOSIS — G47.00 INSOMNIA, UNSPECIFIED TYPE: ICD-10-CM

## 2018-09-10 DIAGNOSIS — F17.200 SMOKER: Primary | ICD-10-CM

## 2018-09-10 PROCEDURE — 99605 MTMS BY PHARM NP 15 MIN: CPT | Performed by: PHARMACIST

## 2018-09-10 PROCEDURE — 99607 MTMS BY PHARM ADDL 15 MIN: CPT | Performed by: PHARMACIST

## 2018-09-10 RX ORDER — POLYETHYLENE GLYCOL 3350 17 G/17G
2 POWDER, FOR SOLUTION ORAL DAILY
Qty: 510 G | Refills: 5 | COMMUNITY
Start: 2018-09-10

## 2018-09-10 RX ORDER — CHLORAL HYDRATE 500 MG
3 CAPSULE ORAL DAILY
Refills: 0 | COMMUNITY
Start: 2018-09-10

## 2018-09-10 RX ORDER — VARENICLINE TARTRATE 0.5 MG/1
TABLET, FILM COATED ORAL
Qty: 60 TABLET | Refills: 3 | Status: SHIPPED | OUTPATIENT
Start: 2018-09-10

## 2018-09-10 RX ORDER — MAGNESIUM 200 MG
1000 TABLET ORAL DAILY
Qty: 30 TABLET | COMMUNITY
Start: 2018-09-10

## 2018-09-10 RX ORDER — FERROUS SULFATE 325(65) MG
325 TABLET ORAL 2 TIMES DAILY
Qty: 100 TABLET | COMMUNITY
Start: 2018-09-10

## 2018-09-10 NOTE — PROGRESS NOTES
SUBJECTIVE/OBJECTIVE:                           Rhys Hernandez is a 43 year old female coming in for an initial visit for Medication Therapy Management.  She was referred to me from urgent care provider for smoking cessation.    Chief Complaint: smoking cessation.  Personal Healthcare Goals: quit smoking and drinking coffee    Allergies/ADRs: Reviewed in Epic  Tobacco: > 1 pack per day -  is interested in quittingTobacco Cessation Action Plan: Pharmacotherapies : Chantix  Alcohol: history of alcohol dependence  Caffeine: 3 cups/day of coffee  Activity: no exercise  PMH: Reviewed in Epic    Medication Adherence/Access:  Patient takes medications directly from bottles.  Patient takes medications multiple time(s) per day as able to swallow.   Per patient, misses medication 0 times per week.   Medication barriers: difficulty swallowing and nausea.     Hx gastric bypass: restrictive pouch surgery 2005, still having to eat pureed food because chokes on too much solid foods. Also difficult to swallow large pills. Taking a number of medication for vitamin replacement post surgery, including iron, B vitamins, vit D, MVI.   Hemoglobin   Date Value Ref Range Status   05/30/2018 14.8 11.7 - 15.7 g/dL Final   ]    Lab Results   Component Value Date    VITDT 26 08/25/2015    VITDT 27 (L) 08/28/2014     Insomnia: currently taking melatonin 3mg every night and works well for her, no side effects.     Elbow pain: taking fish oil 3000mg daily but difficult for her to swallow. Hasn't noticed much difference with taking this supplement. Also takes a couple supplements recommended by her chiropractor for inflammation in elbow and in addition to increasing water intake, finds this regimen beneficial.     Constipation: using Miralax 2 capfuls daily, still has inconsistent BM but Miralax is helpful.     Smoking Cessation:  How much does she smoke:  2 ppd, though has been avg less this week  Why does she smoke: stress relief  Triggers for  smoking include:  Stress, drinking alcohol/coffee  How long has she been smokin years  Tried quitting in the past: Yes.  How many times:  many.  For how long: 3 months longest.  What worked/didn t work in the past: patch, Chantix (side effects when increased Chantix to 1mg BID - tingling in arms)  Why does she want to quit (motivators for quitting): moving into a new house, [facility]  Is patient currently pregnant: No  Occupation: works at [employer], on computer all day.   Has been setting herself up for success with quitting by remaining sober (4 yr anniversary), timing to quit with her , decreasing coffee intake, trying not to smoke while in the car, taking walking breaks instead of smoke breaks at work, reducing #cig smoked at a time    Today's Vitals: BP 95/63      ASSESSMENT:                             Current medications were reviewed today.     Medication Adherence: excellent, no issues identified    Hx gastric bypass: stable. Consider rechecking vit D levels with next labs.    insomnia: stable    Elbow pain: improved. Recommend stopping fish oil due to lack of benefit, poor evidence for efficacy, and difficulty swallowing.    Constipation: stable    Smoking cessation: improved. Discussed options, pt prefers Chantix. Will use 0.5mg BID and increase only if necessary due to prior SE on this dose. Discussed non pharmacologic stress management, encouraged her to consider engagement in structured therapy.       PLAN:                            Start Chantix 0.5mg daily x1 week, then 0.5mg BID    I spent 45 minutes with this patient today. All changes were made via collaborative practice agreement with Delmi Lucas. A copy of the visit note was provided to the patient's primary care provider.    Will follow up in 1 week by phone.    The patient was given a summary of these recommendations as an after visit summary.     Valerie Villanueva, PharmD, BCACP

## 2018-09-10 NOTE — MR AVS SNAPSHOT
After Visit Summary   9/10/2018    Rhys Hernandez    MRN: [id number]           Patient Information     Date Of Birth          1975        Visit Information        Provider Department      9/10/2018 3:00 PM Valerie Villanueva, [facility] MTM        Today's Diagnoses     Smoker    -  1    Constipation, unspecified constipation type          Care Instructions    Recommendations from today's MTM visit:                                                    MTM (medication therapy management) is a service provided by a clinical pharmacist designed to help you get the most of out of your medicines.   Today we reviewed what your medicines are for, how to know if they are working, that your medicines are safe and how to make your medicine regimen as easy as possible.     1. You're set up well to quit smoking!  Great job so far!    2. Star Chantix (varenicline) 0.5mg once a day x7 days (or longer if you need to get used to side effects) then increase to 0.5mg twice a day    3. We talked about getting yourself ready for your quit date - buying stuff to keep hands/mouth busy, getting crafts ready, cutting back on smoking as much as you can this week, etc    Next MTM visit: 1 week    To schedule another MTM appointment, please call the clinic directly or you may call the MTM scheduling line at [phone] or toll-free at [phone].     My Clinical Pharmacist's contact information:                                                      It was a pleasure seeing you today!  Please feel free to contact me with any questions or concerns you have.      Valerie Villanueva, PharmD, [facility]   [phone]    You may receive a survey about the MTM services you received.  I would appreciate your feedback to help me serve you better in the future. Please fill it out and return it when you can. Your comments will be anonymous.      My healthcare goals:                                                       Quit smoking!            Follow-ups after your visit        Your next 10 appointments already scheduled     Sep 17, 2018  4:00 PM CDT   TELEMEDICINE with Valerie Villanueva [facility] MTM ([facility])    [address] [place] [id number] [phone]           Note: this is not an onsite visit; there is no need to come to the facility.              Who to contact     If you have questions or need follow up information about today's clinic visit or your schedule please contact [facility] directly at [phone].  Normal or non-critical lab and imaging results will be communicated to you by MyChart, letter or phone within 4 business days after the clinic has received the results. If you do not hear from us within 7 days, please contact the clinic through MyChart or phone. If you have a critical or abnormal lab result, we will notify you by phone as soon as possible.  Submit refill requests through [employer] or call your pharmacy and they will forward the refill request to us. Please allow 3 business days for your refill to be completed.          Additional Information About Your Visit        Care EveryWhere ID     This is your Care EveryWhere ID. This could be used by other organizations to access your [facility] medical records  [id number]         Blood Pressure from Last 3 Encounters:   09/10/18 95/63   08/27/18 107/71   05/30/18 105/70    Weight from Last 3 Encounters:   08/27/18 152 lb 9.6 oz (69.2 kg)   05/30/18 156 lb (70.8 kg)   04/03/18 159 lb (72.1 kg)              Today, you had the following     No orders found for display         Today's Medication Changes          These changes are accurate as of 9/10/18  3:52 PM.  If you have any questions, ask your nurse or doctor.               These medicines have changed or have updated prescriptions.        Dose/Directions    cyanocobalamin 1000 MCG Subl sublingual  tablet   This may have changed:  See the new instructions.        Dose:  1000 mcg   Place 1 tablet (1,000 mcg) under the tongue daily   Quantity:  30 tablet   Refills:  0       fish oil-omega-3 fatty acids 1000 MG capsule   This may have changed:    - medication strength  - how much to take  - when to take this        Dose:  3 g   Take 3 capsules (3 g) by mouth daily   Refills:  0       IRON SUPPLEMENT 325 (65 Fe) MG tablet   This may have changed:    - medication strength  - how much to take  - when to take this   Generic drug:  ferrous sulfate        Dose:  325 mg   Take 1 tablet (325 mg) by mouth 2 times daily   Quantity:  100 tablet   Refills:  0       melatonin 3 MG Caps   This may have changed:    - medication strength  - how much to take  - when to take this        Dose:  3 mg   Take 3 mg by mouth daily   Refills:  0       MIRALAX powder   This may have changed:  how much to take   Used for:  Constipation, unspecified constipation type   Generic drug:  polyethylene glycol        Dose:  2 capful   Take 34 g (2 capfuls) by mouth daily   Quantity:  510 g   Refills:  5       PRENATAL 1 30-0.975-200 MG Caps   This may have changed:    - medication strength  - how much to take  - when to take this        Dose:  1 tablet   Take 1 tablet by mouth daily   Refills:  0                Primary Care Provider Office Phone # Fax #    Delmi Ml Lucas, APRN [phone] [address]  [facility] [address]        Equal Access to Services     NELLI HOGUE AH: Marybeth Gates, waaxda eric, qaybta kaallucy jimenez, peter alcala . So [facility] [phone].    ATENCIÓN: Si habla español, tiene a drake disposición servicios gratuitos de asistencia lingüística. Nadia al [phone].    We comply with applicable federal civil rights laws and [place] laws. We do not discriminate on the basis of race, color, national origin, age, disability, sex, sexual  orientation, or gender identity.            Thank you!     Thank you for choosing [facility]  for your care. Our goal is always to provide you with excellent care. Hearing back from our patients is one way we can continue to improve our services. Please take a few minutes to complete the written survey that you may receive in the mail after your visit with us. Thank you!             Your Updated Medication List - Protect others around you: Learn how to safely use, store and throw away your medicines at www.disposemymeds.org.          This list is accurate as of 9/10/18  3:52 PM.  Always use your most recent med list.                   Brand Name Dispense Instructions for use Diagnosis    cyanocobalamin 1000 MCG Subl sublingual tablet     30 tablet    Place 1 tablet (1,000 mcg) under the tongue daily        fish oil-omega-3 fatty acids 1000 MG capsule      Take 3 capsules (3 g) by mouth daily        IRON SUPPLEMENT 325 (65 Fe) MG tablet   Generic drug:  ferrous sulfate     100 tablet    Take 1 tablet (325 mg) by mouth 2 times daily        melatonin 3 MG Caps      Take 3 mg by mouth daily        MIRALAX powder   Generic drug:  polyethylene glycol     510 g    Take 34 g (2 capfuls) by mouth daily    Constipation, unspecified constipation type       PRENATAL 1 30-0.975-200 MG Caps      Take 1 tablet by mouth daily        thiamine 100 MG tablet      Take 100 mg by mouth daily.        vitamin D 2000 units tablet     100 tablet    Take 2,000 Units by mouth daily    Unspecified vitamin D deficiency

## 2018-09-10 NOTE — PATIENT INSTRUCTIONS
Recommendations from today's MTM visit:                                                    MTM (medication therapy management) is a service provided by a clinical pharmacist designed to help you get the most of out of your medicines.   Today we reviewed what your medicines are for, how to know if they are working, that your medicines are safe and how to make your medicine regimen as easy as possible.     1. You're set up well to quit smoking!  Great job so far!    2. Star Chantix (varenicline) 0.5mg once a day x7 days (or longer if you need to get used to side effects) then increase to 0.5mg twice a day    3. We talked about getting yourself ready for your quit date - buying stuff to keep hands/mouth busy, getting crafts ready, cutting back on smoking as much as you can this week, etc    Next MTM visit: 1 week    To schedule another MTM appointment, please call the clinic directly or you may call the MTM scheduling line at [phone] or toll-free at [phone].     My Clinical Pharmacist's contact information:                                                      It was a pleasure seeing you today!  Please feel free to contact me with any questions or concerns you have.      Valerie Villanueva, PharmD, [facility]   [phone]    You may receive a survey about the MTM services you received.  I would appreciate your feedback to help me serve you better in the future. Please fill it out and return it when you can. Your comments will be anonymous.      My healthcare goals:                                                      Quit smoking!

## 2018-09-17 ENCOUNTER — TELEPHONE (OUTPATIENT)
Dept: PHARMACY | Facility: CLINIC | Age: 43
End: 2018-09-17

## 2018-09-17 NOTE — TELEPHONE ENCOUNTER
Called pt to follow-up on smoking cessation.    States things are going well, no side effects to Chantix apart from mild upset stomach the first day. She has only smoked 4 cigarettes today!    Discussed billing issues for MTM visits. Unfortunately, visits are not covered by her insurance and would have to pay out of pocket.  Pt is not able to do so at this time. She plans on contacting [employer] to see if she can get free services or something covered by insurance through this organization.      Encouraged her to followup with PCP for any necessary changes to Chantix dosing.     Valerie Villanueva, PharmD, BCACP

## 2018-09-25 DIAGNOSIS — J01.90 ACUTE SINUSITIS WITH COEXISTING CONDITION REQUIRING PROPHYLACTIC TREATMENT: ICD-10-CM

## 2018-09-25 NOTE — TELEPHONE ENCOUNTER
"Requested Prescriptions   Pending Prescriptions Disp Refills     fluticasone (FLONASE) 50 MCG/ACT spray    Last Written Prescription Date:  8/27/2018  Last Fill Quantity: 1,  # refills: 0   Last office visit: No previous visit found with prescribing provider:  Delmi Lucas     Future Office Visit:     1 Bottle 0     Sig: Spray 1-2 sprays into both nostrils daily    Inhaled Steroids Protocol Passed    9/25/2018  2:48 PM       Passed - Patient is age 12 or older       Passed - Recent (12 mo) or future (30 days) visit within the authorizing provider's specialty    Patient had office visit in the last 12 months or has a visit in the next 30 days with authorizing provider or within the authorizing provider's specialty.  See \"Patient Info\" tab in inbasket, or \"Choose Columns\" in Meds & Orders section of the refill encounter.                "

## 2018-09-26 RX ORDER — FLUTICASONE PROPIONATE 50 MCG
2 SPRAY, SUSPENSION (ML) NASAL DAILY
Qty: 1 BOTTLE | Refills: 11 | Status: SHIPPED | OUTPATIENT
Start: 2018-09-26

## 2018-09-26 NOTE — TELEPHONE ENCOUNTER
Routing refill request to provider for review/approval because:  Drug not active on patient's medication list  Pt has not been seen by provider.    Stacie Rosales RN  EP Triage

## 2019-03-25 ENCOUNTER — TELEPHONE (OUTPATIENT)
Dept: NURSING | Facility: CLINIC | Age: 44
End: 2019-03-25

## 2019-03-25 NOTE — TELEPHONE ENCOUNTER
[facility] calling for fax number for medical records. Gave per FNA protocol.  Mirna Ponce RN [facility]

## 2019-07-18 ENCOUNTER — OFFICE VISIT (OUTPATIENT)
Dept: FAMILY MEDICINE | Facility: CLINIC | Age: 44
End: 2019-07-18
Payer: COMMERCIAL

## 2019-07-18 VITALS
DIASTOLIC BLOOD PRESSURE: 67 MMHG | RESPIRATION RATE: 20 BRPM | WEIGHT: 159.2 LBS | BODY MASS INDEX: 26.52 KG/M2 | HEIGHT: 65 IN | SYSTOLIC BLOOD PRESSURE: 103 MMHG | OXYGEN SATURATION: 99 % | TEMPERATURE: 98 F | HEART RATE: 69 BPM

## 2019-07-18 DIAGNOSIS — Z97.5 IUD (INTRAUTERINE DEVICE) IN PLACE: Primary | ICD-10-CM

## 2019-07-18 PROCEDURE — 99212 OFFICE O/P EST SF 10 MIN: CPT | Performed by: FAMILY MEDICINE

## 2019-07-18 ASSESSMENT — MIFFLIN-ST. JEOR: SCORE: 1371.97

## 2019-07-18 ASSESSMENT — PAIN SCALES - GENERAL: PAINLEVEL: NO PAIN (0)

## 2019-07-18 NOTE — PROGRESS NOTES
"Subjective     Rhys Hernandez is a 43 year old female who presents to clinic today for the following health issues:    HPI   Patient is here to have her Mirena Birth Control removed.     Mirena placed 12/15/15.  Discussed that Mirena is good for 5 years not 3.    Reviewed and updated as needed this visit by Provider  Tobacco  Allergies  Meds  Problems  Med Hx  Surg Hx  Fam Hx         Review of Systems   ROS COMP: Constitutional, HEENT, cardiovascular, pulmonary, gi and gu systems are negative, except as otherwise noted.      Objective    /67 (BP Location: Left arm, Patient Position: Sitting, Cuff Size: Adult Regular)   Pulse 69   Temp 98  F (36.7  C) (Oral)   Resp 20   Ht 1.657 m (5' 5.25\")   Wt 72.2 kg (159 lb 3.2 oz)   SpO2 99%   Breastfeeding? No   BMI 26.29 kg/m    Body mass index is 26.29 kg/m .  Physical Exam   GENERAL: healthy, alert and no distress  PSYCH: mentation appears normal, affect normal/bright    Diagnostic Test Results:  Labs reviewed in Epic        Assessment & Plan     1. IUD (intrauterine device) in place  In place and not due for removal.  Discussed procedure for removal and replacement when due.  Patient will return December 2020 to have Mirena removed and replaced.       Tobacco Cessation:   reports that she has been smoking cigarettes.  She has been smoking about 1.00 pack per day. She has never used smokeless tobacco.  Tobacco Cessation Action Plan: Information offered: Patient not interested at this time      BMI:   Estimated body mass index is 26.29 kg/m  as calculated from the following:    Height as of this encounter: 1.657 m (5' 5.25\").    Weight as of this encounter: 72.2 kg (159 lb 3.2 oz).   Weight management plan: Discussed healthy diet and exercise guidelines    The uses and side effects, including black box warnings as appropriate, were discussed in detail.  All patient questions were answered.  The patient was instructed to call immediately if any side " effects developed.     No follow-ups on file.    Geri Gonzalez MD  [facility]

## 2019-07-18 NOTE — PATIENT INSTRUCTIONS
At [facility], we strive to deliver an exceptional experience to you, every time we see you.  If you receive a survey in the mail, please send us back your thoughts. We really do value your feedback.    Based on your medical history, these are the current health maintenance/preventive care services that you are due for (some may have been done at this visit.)  Health Maintenance Due   Topic Date Due     TOBACCO CESSATION COUNSELING  1975     HIV SCREENING  08/10/1988     PREVENTIVE CARE VISIT  11/12/2010     DTAP/TDAP/TD IMMUNIZATION (2 - Td) 01/14/2018     LIPID  08/10/2018     PAP  12/11/2018         Suggested websites for health information:  Www.[employer].org : Up to date and easily searchable information on multiple topics.  Www.medlineplus.gov : medication info, interactive tutorials, watch real surgeries online  Www.familydoctor.org : good info from the Academy of Family Physicians  Www.cdc.gov : public health info, travel advisories, epidemics (H1N1)  Www.aap.org : children's health info, normal development, vaccinations  Www.health.[facility].[place].us : [place] dept of health, public health issues in [place], N1N1    Your care team:                            Family Medicine Internal Medicine   MD Chino Cordoba MD Shantel Branch-Fleming, MD Katya Georgiev PA-C Nam Ho, MD Pediatrics   GURJIT Ghotra, MD Lisset Morales CNP, MD Deborah Mielke, MD Kim Thein, APRN [facility]      Clinic hours: Monday - Thursday 7 am-7 pm; Fridays 7 am-5 pm.   Urgent care: Monday - Friday 11 am-9 pm; Saturday and Sunday 9 am-5 pm.  Pharmacy : Monday -Thursday 8 am-8 pm; Friday 8 am-6 pm; Saturday and Sunday 9 am-5 pm.     Clinic: [phone]   Pharmacy: [phone]

## 2020-11-03 ENCOUNTER — OFFICE VISIT (OUTPATIENT)
Dept: URGENT CARE | Facility: URGENT CARE | Age: 45
End: 2020-11-03
Payer: OTHER GOVERNMENT

## 2020-11-03 VITALS
SYSTOLIC BLOOD PRESSURE: 99 MMHG | DIASTOLIC BLOOD PRESSURE: 64 MMHG | OXYGEN SATURATION: 97 % | RESPIRATION RATE: 18 BRPM | HEART RATE: 94 BPM | TEMPERATURE: 98.7 F

## 2020-11-03 DIAGNOSIS — Z20.822 SUSPECTED COVID-19 VIRUS INFECTION: ICD-10-CM

## 2020-11-03 DIAGNOSIS — R05.9 COUGH: Primary | ICD-10-CM

## 2020-11-03 PROCEDURE — 99213 OFFICE O/P EST LOW 20 MIN: CPT | Performed by: NURSE PRACTITIONER

## 2020-11-03 PROCEDURE — U0003 INFECTIOUS AGENT DETECTION BY NUCLEIC ACID (DNA OR RNA); SEVERE ACUTE RESPIRATORY SYNDROME CORONAVIRUS 2 (SARS-COV-2) (CORONAVIRUS DISEASE [COVID-19]), AMPLIFIED PROBE TECHNIQUE, MAKING USE OF HIGH THROUGHPUT TECHNOLOGIES AS DESCRIBED BY CMS-2020-01-R: HCPCS | Performed by: NURSE PRACTITIONER

## 2020-11-03 ASSESSMENT — ENCOUNTER SYMPTOMS
HEADACHES: 0
RHINORRHEA: 1
DIARRHEA: 1
CHILLS: 0
SHORTNESS OF BREATH: 0
VOMITING: 1
NAUSEA: 0
ABDOMINAL PAIN: 1
COUGH: 1
FEVER: 0
SORE THROAT: 0
FATIGUE: 1

## 2020-11-03 NOTE — PATIENT INSTRUCTIONS
"  Patient Education     Coronavirus Disease 2019 (COVID-19): Prevention  The best prevention is to have no contact with the SARS-CoV-2 virus. There is no vaccine yet.  It s important to keep up on vaccines for other illnesses, including flu and pnuemonia. This is even more true if you re at higher risk for severe illness. Everyone 6 months and older should get a yearly flu vaccine, with rare exceptions.  Cancel travel and other outings  Stay informed about COVID-19 in your area. Follow local instructions. School, sporting events and other activities may be cancelled. You may need to avoid public gatherings.  Stay at least 6 feet away from others as much as possible. This is called \"social distancing.\" You may also be asked to stay at home and isolate yourself. You may hear terms like \"self-isolate, \"quarantine,\"  stay at home,   shelter in place,  and  lockdown.   Don t travel to areas with a COVID-19 outbreak. Don t go on a cruise. It s best to cancel any non-essential travel right now. For the most up-to-date travel advisories, visit the CDC website at www.cdc.gov/coronavirus/2019-ncov/travelers.  When you re at home    Wash your hands often. Use soap and clean, running water for at least 20 seconds. Or, use hand  that has at least 60% alcohol.    Don t touch your eyes, nose, or mouth unless you have clean hands.    Don t kiss someone who is sick.    If you need to cough or sneeze, do it into a tissue. Then throw the tissue into the trash. If you don t have tissues, cough or sneeze into the bend of your elbow.    Try to avoid \"high-touch\" surfaces, like doorknobs, handles, light switches, desks, printers, phones, kitchen counters, tables and bathroom surfaces. Clean these often with disinfectant (read the label for instructions). For cleaning tips, go to www.cdc.gov/coronavirus/2019-ncov/prepare/cleaning-disinfection.html.    Check your home supplies. Consider keeping a 2-week supply of medicines, food, " "and other needed household items.    Make a plan for childcare, work, and ways to stay in touch with others. Know who will help you if you get sick.    Don t be around people who are sick.    Don t share eating or drinking utensils with sick people.    Wash your hands after touching animals. Don t touch animals that may be sick.    If you leave home      Stay at least 6 feet away from all people.    Try to avoid \"high-touch\" public surfaces, like doorknobs, handles, and light switches. If you need to touch these, clean them first with a disinfecting wipe. Or, touch them using a tissue or paper towel.    Use hand  often. Make sure it has at least 60% alcohol.    Don t touch your eyes, nose, or mouth unless you have clean hands.    If you need to cough or sneeze, do it into a tissue. Then, throw the tissue into the trash. If you don t have tissues, cough or sneeze into the bend of your elbow.    Avoid public gatherings. If you do attend public gatherings, stay at least 6 feet away from others. Don t share food, water bottles, or other personal items.    Anyone over age 2 should wear a cloth face mask in public, especially when it s hard to socially distance. This includes public transit, public protests and marches, and crowded stores, bars, and restaurants.  ? The mask should cover both your nose and mouth. You may need to make your own mask using a bandana, T-shirt, or other cloth. See the CDC s instructions on how to make a mask.  ? Face masks are most likely to reduce the spread of COVID-19 when they are widely used by people who are out in the public.    Certain people should not wear a face mask. These include:  ? Children under 2 years old  ? Anyone with a health, developmental, or mental health condition that can be made worse by wearing a mask  ? Anyone who is unconscious or unable to remove the face covering without help. See the CDC s guidance on who should not wear a face mask.  If you are at a " work site    Follow all of the instructions above.    If you feel sick in any way, go home and stay home.    Tell your manager if you are well but live with someone who has COVID-19.    Wash your hands often with soap and clean, running water for at least 20 seconds. Or, use hand  with at least 60% alcohol.    Don t shake hands. Don t have in-person meetings. (Meet over phone or video, if possible.)    Don t use other people s desks, offices, phones or equipment (pens, keyboards, eating or drinking utensils, etc.), if possible.    Use office wilder one person at a time. Don t share coffee, tea or food in the office. Don t have meals in groups.    Wear a mask over your nose and mouth.    Clean work surfaces often with disinfectant. These include desks, photocopiers, printers, phones, kitchen counters, fridge door handles, bathroom surfaces, and others.    If you ve been around someone with COVID-19  In the past 14 days, if you ve been around someone who has (or may have) COVID-19:    Contact your care team to ask for advice. Follow all instructions. You may need to stay home and limit your activities for up to 2 weeks.    Take your temperature every morning and evening for at least 14 days. This is to check for fever. Keep a record of the readings.    Watch for symptoms of the virus. (See the CDC s symptom .) If you have symptoms, contact your care team.    Stay home if you re sick for any reason. If you need to go to a clinic or hospital, call ahead to let them know you re coming.    If you ve had COVID-19 in the last 3 months, but now you re symptom-free, your limits are different: You don t need to self-isolate. You don t need to be re-tested for COVID-19, unless you have symptoms of the virus. (If you do develop symptoms, stay home.)    If you had COVID-19 more than 3 months ago, and you ve been exposed again: Treat it like you ve never had COVID-19. Stay home, limit your contact with others,  call your care team, and check for symptoms.  When to contact your care team  Call your care team if you think you have COVID-19 symptoms. These can include fever, cough, trouble breathing, body aches, headaches, chills or repeated shaking with chills, sore throat, loss of taste or smell, or diarrhea (loose, watery poops). Don t go to a clinic or hospital before speaking to your care team.  Last modified date: 10/13/2020  Anta last reviewed this educational content on 4/1/2020  This information has been modified by your health care provider with permission from the publisher.    6717-6210 [employer], [employer]. [address] [place] [address], [place] [address]. All rights reserved. This information is not intended as a substitute for professional medical care. Always follow your healthcare professional s instructions.

## 2020-11-03 NOTE — PROGRESS NOTES
SUBJECTIVE:   Rhys Hernandez is a 45 year old female presenting with a chief complaint of   Chief Complaint   Patient presents with     Abdominal Pain     Stomach ache, vomiting, dairrhea, fatigue, runny nose, cough since 11/1/20. Pt wants COVID test        She is an established patient of [facility].    URI Adult    Onset of symptoms was 3 day(s) ago.  Course of illness is worsening.    Severity moderate  Current and Associated symptoms: fever, chills, cough - non-productive, running nose, sore throat, body aches, diarrhea and abdominal pain  Treatment measures tried include None tried.  Predisposing factors include None.        Review of Systems   Constitutional: Positive for fatigue. Negative for chills and fever.   HENT: Positive for rhinorrhea. Negative for congestion, ear pain and sore throat.    Respiratory: Positive for cough. Negative for shortness of breath.    Gastrointestinal: Positive for abdominal pain, diarrhea and vomiting. Negative for nausea.   Neurological: Negative for headaches.   All other systems reviewed and are negative.      Past Medical History:   Diagnosis Date     Alcohol dependence (H)     resolved     Anxiety      Bipolar II disorder 8/28/2014     Kidney stone      Pyelonephritis      Family History   Problem Relation Age of Onset     Alcohol/Drug Mother      Depression Mother      Genitourinary Problems Mother         Kidney stones and liver and kidney transplant due to hep c     Alcohol/Drug Father      Depression Father      Current Outpatient Medications   Medication Sig Dispense Refill     Cholecalciferol (VITAMIN D) 2000 UNITS tablet Take 2,000 Units by mouth daily 100 tablet 3     cyanocobalamin 1000 MCG SUBL sublingual tablet Place 1 tablet (1,000 mcg) under the tongue daily 30 tablet      ferrous sulfate (IRON SUPPLEMENT) 325 (65 Fe) MG tablet Take 1 tablet (325 mg) by mouth 2 times daily 100 tablet      fish oil-omega-3 fatty acids (OMEGA-3 FISH OIL) 1000 MG capsule Take 3  capsules (3 g) by mouth daily  0     fluticasone (FLONASE) 50 MCG/ACT spray Spray 2 sprays into both nostrils daily 1 Bottle 11     melatonin 3 MG CAPS Take 3 mg by mouth daily       polyethylene glycol (MIRALAX) powder Take 34 g (2 capfuls) by mouth daily 510 g 5     Prenatal MV-Min-Fe Fum-FA-DHA (PRENATAL 1) 30-0.975-200 MG CAPS Take 1 tablet by mouth daily       thiamine (VITAMIN B-1) 100 MG tablet Take 100 mg by mouth daily.       varenicline (CHANTIX) 0.5 MG tablet Start 1 tablet once daily x7 weeks, then increase to 1 tablet twice a day 60 tablet 3     Social History     Tobacco Use     Smoking status: Current Every Day Smoker     Packs/day: 1.00     Types: Cigarettes     Smokeless tobacco: Never Used   Substance Use Topics     Alcohol use: No     Alcohol/week: 0.0 standard drinks     Comment: last drink 2014       OBJECTIVE  BP 99/64   Pulse 94   Temp 98.7  F (37.1  C) (Oral)   Resp 18   SpO2 97%     Physical Exam  HENT:      Right Ear: External ear normal.      Left Ear: External ear normal.      Nose: Nose normal.   Eyes:      Conjunctiva/sclera: Conjunctivae normal.   Cardiovascular:      Rate and Rhythm: Normal rate.   Pulmonary:      Effort: Pulmonary effort is normal.      Breath sounds: Normal breath sounds.   Musculoskeletal: Normal range of motion.   Skin:     General: Skin is warm.   Neurological:      General: No focal deficit present.      Mental Status: She is alert.   Psychiatric:         Mood and Affect: Mood normal.         ASSESSMENT:      ICD-10-CM    1. Cough  R05 Symptomatic COVID-19 Virus (Coronavirus) by PCR   2. Suspected COVID-19 virus infection  Z20.828         PLAN:      Patient Instructions     Patient Education     Coronavirus Disease 2019 (COVID-19): Prevention  The best prevention is to have no contact with the SARS-CoV-2 virus. There is no vaccine yet.  It s important to keep up on vaccines for other illnesses, including flu and pnuemonia. This is even more true if you re at  "higher risk for severe illness. Everyone 6 months and older should get a yearly flu vaccine, with rare exceptions.  Cancel travel and other outings  Stay informed about COVID-19 in your area. Follow local instructions. School, sporting events and other activities may be cancelled. You may need to avoid public gatherings.  Stay at least 6 feet away from others as much as possible. This is called \"social distancing.\" You may also be asked to stay at home and isolate yourself. You may hear terms like \"self-isolate, \"quarantine,\"  stay at home,   shelter in place,  and  lockdown.   Don t travel to areas with a COVID-19 outbreak. Don t go on a cruise. It s best to cancel any non-essential travel right now. For the most up-to-date travel advisories, visit the CDC website at www.cdc.gov/coronavirus/2019-ncov/travelers.  When you re at home    Wash your hands often. Use soap and clean, running water for at least 20 seconds. Or, use hand  that has at least 60% alcohol.    Don t touch your eyes, nose, or mouth unless you have clean hands.    Don t kiss someone who is sick.    If you need to cough or sneeze, do it into a tissue. Then throw the tissue into the trash. If you don t have tissues, cough or sneeze into the bend of your elbow.    Try to avoid \"high-touch\" surfaces, like doorknobs, handles, light switches, desks, printers, phones, kitchen counters, tables and bathroom surfaces. Clean these often with disinfectant (read the label for instructions). For cleaning tips, go to www.cdc.gov/coronavirus/2019-ncov/prepare/cleaning-disinfection.html.    Check your home supplies. Consider keeping a 2-week supply of medicines, food, and other needed household items.    Make a plan for childcare, work, and ways to stay in touch with others. Know who will help you if you get sick.    Don t be around people who are sick.    Don t share eating or drinking utensils with sick people.    Wash your hands after touching animals. " "Don t touch animals that may be sick.    If you leave home      Stay at least 6 feet away from all people.    Try to avoid \"high-touch\" public surfaces, like doorknobs, handles, and light switches. If you need to touch these, clean them first with a disinfecting wipe. Or, touch them using a tissue or paper towel.    Use hand  often. Make sure it has at least 60% alcohol.    Don t touch your eyes, nose, or mouth unless you have clean hands.    If you need to cough or sneeze, do it into a tissue. Then, throw the tissue into the trash. If you don t have tissues, cough or sneeze into the bend of your elbow.    Avoid public gatherings. If you do attend public gatherings, stay at least 6 feet away from others. Don t share food, water bottles, or other personal items.    Anyone over age 2 should wear a cloth face mask in public, especially when it s hard to socially distance. This includes public transit, public protests and marches, and crowded stores, bars, and restaurants.  ? The mask should cover both your nose and mouth. You may need to make your own mask using a bandana, T-shirt, or other cloth. See the CDC s instructions on how to make a mask.  ? Face masks are most likely to reduce the spread of COVID-19 when they are widely used by people who are out in the public.    Certain people should not wear a face mask. These include:  ? Children under 2 years old  ? Anyone with a health, developmental, or mental health condition that can be made worse by wearing a mask  ? Anyone who is unconscious or unable to remove the face covering without help. See the CDC s guidance on who should not wear a face mask.  If you are at a work site    Follow all of the instructions above.    If you feel sick in any way, go home and stay home.    Tell your manager if you are well but live with someone who has COVID-19.    Wash your hands often with soap and clean, running water for at least 20 seconds. Or, use hand  with " at least 60% alcohol.    Don t shake hands. Don t have in-person meetings. (Meet over phone or video, if possible.)    Don t use other people s desks, offices, phones or equipment (pens, keyboards, eating or drinking utensils, etc.), if possible.    Use office wilder one person at a time. Don t share coffee, tea or food in the office. Don t have meals in groups.    Wear a mask over your nose and mouth.    Clean work surfaces often with disinfectant. These include desks, photocopiers, printers, phones, kitchen counters, fridge door handles, bathroom surfaces, and others.    If you ve been around someone with COVID-19  In the past 14 days, if you ve been around someone who has (or may have) COVID-19:    Contact your care team to ask for advice. Follow all instructions. You may need to stay home and limit your activities for up to 2 weeks.    Take your temperature every morning and evening for at least 14 days. This is to check for fever. Keep a record of the readings.    Watch for symptoms of the virus. (See the CDC s symptom .) If you have symptoms, contact your care team.    Stay home if you re sick for any reason. If you need to go to a clinic or hospital, call ahead to let them know you re coming.    If you ve had COVID-19 in the last 3 months, but now you re symptom-free, your limits are different: You don t need to self-isolate. You don t need to be re-tested for COVID-19, unless you have symptoms of the virus. (If you do develop symptoms, stay home.)    If you had COVID-19 more than 3 months ago, and you ve been exposed again: Treat it like you ve never had COVID-19. Stay home, limit your contact with others, call your care team, and check for symptoms.  When to contact your care team  Call your care team if you think you have COVID-19 symptoms. These can include fever, cough, trouble breathing, body aches, headaches, chills or repeated shaking with chills, sore throat, loss of taste or smell, or  diarrhea (loose, watery poops). Don t go to a clinic or hospital before speaking to your care team.  Last modified date: 10/13/2020  Anat last reviewed this educational content on 4/1/2020  This information has been modified by your health care provider with permission from the publisher.    2659-6859 [employer]. [address] [place] [address], [place] [address]. All rights reserved. This information is not intended as a substitute for professional medical care. Always follow your healthcare professional s instructions.

## 2020-11-04 LAB
SARS-COV-2 RNA SPEC QL NAA+PROBE: NOT DETECTED
SPECIMEN SOURCE: NORMAL

## 2020-11-11 ENCOUNTER — NURSE TRIAGE (OUTPATIENT)
Dept: NURSING | Facility: CLINIC | Age: 45
End: 2020-11-11

## 2020-11-11 NOTE — TELEPHONE ENCOUNTER
Coronavirus (COVID-19) Notification    Lab Result   Lab test 2019-nCoV rRt-PCR OR SARS-COV-2 PCR    Nasopharyngeal AND/OR Oropharyngeal swab is NEGATIVE for 2019-nCoV RNA [OR] SARS-COV-2 RNA (COVID-19) RNA    Your result was negative. This means that we didn't find the virus that causes COVID-19 in your sample. A test may show negative when you do actually have the virus. This can happen when the virus is in the early stages of infection, before you feel illness symptoms.    If you have symptoms   Stay home and away from others (self-isolate) until you meet ALL of the guidelines below:    You've had no fever--and no medicine that reduces fever--for 1 full day (24 hours). And      Your other symptoms have gotten better. For example, your cough or breathing has improved. And   ; At least 10 days have passed since your symptoms started. (If you've been told by a doctor that you have a weak immune system, wait 20 days.)         During this time:    Stay home. Don't go to work, school or anywhere else.     Stay in your own room, including for meals. Use your own bathroom if you can.    Stay away from others in your home. No hugging, kissing or shaking hands. No visitors.    Clean  high touch  surfaces often (doorknobs, counters, handles, etc.). Use a household cleaning spray or wipes. You can find a full list on the EPA website at www.epa.gov/pesticide-registration/list-n-disinfectants-use-against-sars-cov-2.    Cover your mouth and nose with a mask, tissue or other face covering to avoid spreading germs.    Wash your hands and face often with soap and water.    Going back to work  Check with your employer for any guidelines to follow for going back to work.  You are sent a letter for your Employer which will serve as formal document notice that you, the employee, tested negative for COVID-19, as of the testing date shown above.    If your symptoms worsen or other concerning symptoms, contact PCP, oncare or consider  returning to Emergency Dept.    Where can I get more information?    [facility]: [email or web address]    Coronavirus Basics: [email or web address]/diseases/coronavirus/basics.html    [facility] Hotline (736-574-4335)    Patient calling for results and verbalized understanding.    Lucy Cabral RN  [facility]

## 2021-01-14 ENCOUNTER — HEALTH MAINTENANCE LETTER (OUTPATIENT)
Age: 46
End: 2021-01-14

## 2021-03-14 ENCOUNTER — HEALTH MAINTENANCE LETTER (OUTPATIENT)
Age: 46
End: 2021-03-14

## 2021-10-24 ENCOUNTER — HEALTH MAINTENANCE LETTER (OUTPATIENT)
Age: 46
End: 2021-10-24

## 2022-02-13 ENCOUNTER — HEALTH MAINTENANCE LETTER (OUTPATIENT)
Age: 47
End: 2022-02-13

## 2022-04-10 ENCOUNTER — HEALTH MAINTENANCE LETTER (OUTPATIENT)
Age: 47
End: 2022-04-10

## 2022-10-15 ENCOUNTER — HEALTH MAINTENANCE LETTER (OUTPATIENT)
Age: 47
End: 2022-10-15

## 2023-03-26 ENCOUNTER — HEALTH MAINTENANCE LETTER (OUTPATIENT)
Age: 48
End: 2023-03-26

## 2023-05-27 ENCOUNTER — HEALTH MAINTENANCE LETTER (OUTPATIENT)
Age: 48
End: 2023-05-27